# Patient Record
Sex: FEMALE | Race: WHITE | URBAN - METROPOLITAN AREA
[De-identification: names, ages, dates, MRNs, and addresses within clinical notes are randomized per-mention and may not be internally consistent; named-entity substitution may affect disease eponyms.]

---

## 2017-01-15 RX ORDER — ROSUVASTATIN CALCIUM 10 MG/1
TABLET, FILM COATED ORAL
Qty: 90 TABLET | Refills: 0 | Status: SHIPPED | OUTPATIENT
Start: 2017-01-15

## 2017-04-16 RX ORDER — ROSUVASTATIN CALCIUM 10 MG/1
TABLET, FILM COATED ORAL
Qty: 90 TABLET | Refills: 0 | OUTPATIENT
Start: 2017-04-16

## 2017-04-17 NOTE — TELEPHONE ENCOUNTER
Please call patient to set up an appointment with me, possibly today or my next day office schedule.

## 2017-04-21 NOTE — TELEPHONE ENCOUNTER
Spoke to pt notified of below. Offered appt. Pt states she will call back to schedule a follow-up.  Pt had blood work done at different MD office and will bring records along.     -kisha

## 2020-01-16 NOTE — LETTER
3/12/2024    Return to Work    Name: Starr Richey        : 1965    To Whom It May Concern,    Starr Richey had surgery on 2024 and is:    Not able to return to school / work.    The patient may return to work on 4/15/2024 with no restrictions.    If there are any further questions, regarding this patient's care, please contact the patient directly.    Sincerely,    Renee Elizabeth PA-C     Complex Repair And Melolabial Flap Text: The defect edges were debeveled with a #15 scalpel blade.  The primary defect was closed partially with a complex linear closure.  Given the location of the remaining defect, shape of the defect and the proximity to free margins a melolabial flap was deemed most appropriate for complete closure of the defect.  Using a sterile surgical marker, an appropriate advancement flap was drawn incorporating the defect and placing the expected incisions within the relaxed skin tension lines where possible.    The area thus outlined was incised deep to adipose tissue with a #15 scalpel blade.  The skin margins were undermined to an appropriate distance in all directions utilizing iris scissors.

## 2021-09-20 ENCOUNTER — MED REC SCAN ONLY (OUTPATIENT)
Dept: FAMILY MEDICINE CLINIC | Facility: CLINIC | Age: 56
End: 2021-09-20

## 2024-02-26 ENCOUNTER — ANESTHESIA EVENT (OUTPATIENT)
Dept: SURGERY | Facility: HOSPITAL | Age: 59
End: 2024-02-26
Payer: COMMERCIAL

## 2024-02-26 ENCOUNTER — HOSPITAL ENCOUNTER (INPATIENT)
Facility: HOSPITAL | Age: 59
LOS: 1 days | Discharge: HOME | End: 2024-02-27
Attending: EMERGENCY MEDICINE | Admitting: STUDENT IN AN ORGANIZED HEALTH CARE EDUCATION/TRAINING PROGRAM
Payer: COMMERCIAL

## 2024-02-26 ENCOUNTER — HOSPITAL ENCOUNTER (OUTPATIENT)
Age: 59
Discharge: ANOTHER HOSPITAL | End: 2024-02-26
Attending: EMERGENCY MEDICINE
Payer: COMMERCIAL

## 2024-02-26 ENCOUNTER — APPOINTMENT (OUTPATIENT)
Dept: CT IMAGING | Age: 59
End: 2024-02-26
Attending: EMERGENCY MEDICINE
Payer: COMMERCIAL

## 2024-02-26 ENCOUNTER — ANESTHESIA (OUTPATIENT)
Dept: SURGERY | Facility: HOSPITAL | Age: 59
End: 2024-02-26
Payer: COMMERCIAL

## 2024-02-26 VITALS
HEART RATE: 81 BPM | BODY MASS INDEX: 29 KG/M2 | OXYGEN SATURATION: 99 % | TEMPERATURE: 98 F | RESPIRATION RATE: 16 BRPM | WEIGHT: 152 LBS | SYSTOLIC BLOOD PRESSURE: 131 MMHG | DIASTOLIC BLOOD PRESSURE: 85 MMHG

## 2024-02-26 DIAGNOSIS — K35.80 ACUTE APPENDICITIS, UNSPECIFIED ACUTE APPENDICITIS TYPE: Primary | ICD-10-CM

## 2024-02-26 DIAGNOSIS — K35.80 ACUTE APPENDICITIS: ICD-10-CM

## 2024-02-26 DIAGNOSIS — K35.30 ACUTE APPENDICITIS WITH LOCALIZED PERITONITIS, WITHOUT PERFORATION, ABSCESS, OR GANGRENE: Primary | ICD-10-CM

## 2024-02-26 LAB
#MXD IC: 0.6 X10ˆ3/UL (ref 0.1–1)
ALBUMIN SERPL-MCNC: 3.8 G/DL (ref 3.4–5)
ALBUMIN/GLOB SERPL: 1.2 {RATIO} (ref 1–2)
ALP LIVER SERPL-CCNC: 126 U/L
ALT SERPL-CCNC: 43 U/L
ANION GAP SERPL CALC-SCNC: 1 MMOL/L (ref 0–18)
AST SERPL-CCNC: 38 U/L (ref 15–37)
BASOPHILS # BLD AUTO: 0.03 X10(3) UL (ref 0–0.2)
BASOPHILS NFR BLD AUTO: 0.3 %
BILIRUB SERPL-MCNC: 0.8 MG/DL (ref 0.1–2)
BILIRUB UR QL STRIP: NEGATIVE
BUN BLD-MCNC: 7 MG/DL (ref 9–23)
BUN BLD-MCNC: 9 MG/DL (ref 7–18)
CALCIUM BLD-MCNC: 8.9 MG/DL (ref 8.5–10.1)
CHLORIDE BLD-SCNC: 104 MMOL/L (ref 98–112)
CHLORIDE SERPL-SCNC: 110 MMOL/L (ref 98–112)
CLARITY UR: CLEAR
CO2 BLD-SCNC: 27 MMOL/L (ref 21–32)
CO2 SERPL-SCNC: 26 MMOL/L (ref 21–32)
CREAT BLD-MCNC: 0.5 MG/DL
CREAT BLD-MCNC: 0.56 MG/DL
EGFRCR SERPLBLD CKD-EPI 2021: 104 ML/MIN/1.73M2 (ref 60–?)
EGFRCR SERPLBLD CKD-EPI 2021: 107 ML/MIN/1.73M2 (ref 60–?)
EOSINOPHIL # BLD AUTO: 0.04 X10(3) UL (ref 0–0.7)
EOSINOPHIL NFR BLD AUTO: 0.4 %
ERYTHROCYTE [DISTWIDTH] IN BLOOD BY AUTOMATED COUNT: 11.6 %
GLOBULIN PLAS-MCNC: 3.3 G/DL (ref 2.8–4.4)
GLUCOSE BLD-MCNC: 102 MG/DL (ref 70–99)
GLUCOSE BLD-MCNC: 92 MG/DL (ref 70–99)
GLUCOSE UR STRIP-MCNC: NEGATIVE MG/DL
HCT VFR BLD AUTO: 39 %
HCT VFR BLD AUTO: 39.6 %
HCT VFR BLD CALC: 40 %
HGB BLD-MCNC: 13.4 G/DL
HGB BLD-MCNC: 13.5 G/DL
HGB UR QL STRIP: NEGATIVE
IMM GRANULOCYTES # BLD AUTO: 0.02 X10(3) UL (ref 0–1)
IMM GRANULOCYTES NFR BLD: 0.2 %
ISTAT IONIZED CALCIUM FOR CHEM 8: 1.2 MMOL/L (ref 1.12–1.32)
KETONES UR STRIP-MCNC: NEGATIVE MG/DL
LEUKOCYTE ESTERASE UR QL STRIP: NEGATIVE
LYMPHOCYTES # BLD AUTO: 1.5 X10ˆ3/UL (ref 1–4)
LYMPHOCYTES # BLD AUTO: 1.76 X10(3) UL (ref 1–4)
LYMPHOCYTES NFR BLD AUTO: 15.1 %
LYMPHOCYTES NFR BLD AUTO: 19.5 %
MCH RBC QN AUTO: 31.1 PG (ref 26–34)
MCH RBC QN AUTO: 31.8 PG (ref 26–34)
MCHC RBC AUTO-ENTMCNC: 33.8 G/DL (ref 31–37)
MCHC RBC AUTO-ENTMCNC: 34.6 G/DL (ref 31–37)
MCV RBC AUTO: 91.8 FL (ref 80–100)
MCV RBC AUTO: 91.9 FL
MIXED CELL %: 6.1 %
MONOCYTES # BLD AUTO: 0.57 X10(3) UL (ref 0.1–1)
MONOCYTES NFR BLD AUTO: 6.3 %
NEUTROPHILS # BLD AUTO: 6.59 X10 (3) UL (ref 1.5–7.7)
NEUTROPHILS # BLD AUTO: 6.59 X10(3) UL (ref 1.5–7.7)
NEUTROPHILS # BLD AUTO: 7.6 X10ˆ3/UL (ref 1.5–7.7)
NEUTROPHILS NFR BLD AUTO: 73.3 %
NEUTROPHILS NFR BLD AUTO: 78.8 %
NITRITE UR QL STRIP: NEGATIVE
OSMOLALITY SERPL CALC.SUM OF ELEC: 282 MOSM/KG (ref 275–295)
PH UR STRIP: 6.5 [PH]
PLATELET # BLD AUTO: 211 10(3)UL (ref 150–450)
PLATELET # BLD AUTO: 215 X10ˆ3/UL (ref 150–450)
POTASSIUM BLD-SCNC: 4 MMOL/L (ref 3.6–5.1)
POTASSIUM SERPL-SCNC: 3.4 MMOL/L (ref 3.5–5.1)
PROT SERPL-MCNC: 7.1 G/DL (ref 6.4–8.2)
PROT UR STRIP-MCNC: NEGATIVE MG/DL
RBC # BLD AUTO: 4.25 X10ˆ6/UL
RBC # BLD AUTO: 4.31 X10(6)UL
SODIUM BLD-SCNC: 140 MMOL/L (ref 136–145)
SODIUM SERPL-SCNC: 137 MMOL/L (ref 136–145)
SP GR UR STRIP: >=1.03
UROBILINOGEN UR STRIP-ACNC: <2 MG/DL
WBC # BLD AUTO: 9 X10(3) UL (ref 4–11)
WBC # BLD AUTO: 9.7 X10ˆ3/UL (ref 4–11)

## 2024-02-26 PROCEDURE — 96360 HYDRATION IV INFUSION INIT: CPT

## 2024-02-26 PROCEDURE — 85025 COMPLETE CBC W/AUTO DIFF WBC: CPT | Performed by: EMERGENCY MEDICINE

## 2024-02-26 PROCEDURE — 99222 1ST HOSP IP/OBS MODERATE 55: CPT | Performed by: STUDENT IN AN ORGANIZED HEALTH CARE EDUCATION/TRAINING PROGRAM

## 2024-02-26 PROCEDURE — 74177 CT ABD & PELVIS W/CONTRAST: CPT | Performed by: EMERGENCY MEDICINE

## 2024-02-26 PROCEDURE — 81002 URINALYSIS NONAUTO W/O SCOPE: CPT

## 2024-02-26 PROCEDURE — 44970 LAPAROSCOPY APPENDECTOMY: CPT | Performed by: STUDENT IN AN ORGANIZED HEALTH CARE EDUCATION/TRAINING PROGRAM

## 2024-02-26 PROCEDURE — 0DTJ4ZZ RESECTION OF APPENDIX, PERCUTANEOUS ENDOSCOPIC APPROACH: ICD-10-PCS | Performed by: STUDENT IN AN ORGANIZED HEALTH CARE EDUCATION/TRAINING PROGRAM

## 2024-02-26 PROCEDURE — 99205 OFFICE O/P NEW HI 60 MIN: CPT

## 2024-02-26 PROCEDURE — 80047 BASIC METABLC PNL IONIZED CA: CPT

## 2024-02-26 RX ORDER — NALOXONE HYDROCHLORIDE 0.4 MG/ML
0.08 INJECTION, SOLUTION INTRAMUSCULAR; INTRAVENOUS; SUBCUTANEOUS AS NEEDED
Status: DISCONTINUED | OUTPATIENT
Start: 2024-02-26 | End: 2024-02-26 | Stop reason: HOSPADM

## 2024-02-26 RX ORDER — KETOROLAC TROMETHAMINE 15 MG/ML
15 INJECTION, SOLUTION INTRAMUSCULAR; INTRAVENOUS EVERY 6 HOURS PRN
Status: DISCONTINUED | OUTPATIENT
Start: 2024-02-26 | End: 2024-02-27

## 2024-02-26 RX ORDER — DEXAMETHASONE SODIUM PHOSPHATE 4 MG/ML
VIAL (ML) INJECTION AS NEEDED
Status: DISCONTINUED | OUTPATIENT
Start: 2024-02-26 | End: 2024-02-26 | Stop reason: SURG

## 2024-02-26 RX ORDER — PROCHLORPERAZINE EDISYLATE 5 MG/ML
5 INJECTION INTRAMUSCULAR; INTRAVENOUS EVERY 8 HOURS PRN
Status: DISCONTINUED | OUTPATIENT
Start: 2024-02-26 | End: 2024-02-26 | Stop reason: HOSPADM

## 2024-02-26 RX ORDER — FAMOTIDINE 10 MG/ML
20 INJECTION, SOLUTION INTRAVENOUS 2 TIMES DAILY
Status: DISCONTINUED | OUTPATIENT
Start: 2024-02-26 | End: 2024-02-27

## 2024-02-26 RX ORDER — HEPARIN SODIUM 5000 [USP'U]/ML
5000 INJECTION, SOLUTION INTRAVENOUS; SUBCUTANEOUS EVERY 8 HOURS SCHEDULED
Status: DISCONTINUED | OUTPATIENT
Start: 2024-02-27 | End: 2024-02-26

## 2024-02-26 RX ORDER — LIDOCAINE HYDROCHLORIDE 10 MG/ML
INJECTION, SOLUTION EPIDURAL; INFILTRATION; INTRACAUDAL; PERINEURAL AS NEEDED
Status: DISCONTINUED | OUTPATIENT
Start: 2024-02-26 | End: 2024-02-26 | Stop reason: SURG

## 2024-02-26 RX ORDER — ACETAMINOPHEN 325 MG/1
650 TABLET ORAL EVERY 6 HOURS
Status: DISCONTINUED | OUTPATIENT
Start: 2024-02-26 | End: 2024-02-27

## 2024-02-26 RX ORDER — METOCLOPRAMIDE HYDROCHLORIDE 5 MG/ML
INJECTION INTRAMUSCULAR; INTRAVENOUS AS NEEDED
Status: DISCONTINUED | OUTPATIENT
Start: 2024-02-26 | End: 2024-02-26 | Stop reason: SURG

## 2024-02-26 RX ORDER — HYDROCODONE BITARTRATE AND ACETAMINOPHEN 5; 325 MG/1; MG/1
1 TABLET ORAL ONCE AS NEEDED
Status: DISCONTINUED | OUTPATIENT
Start: 2024-02-26 | End: 2024-02-26 | Stop reason: HOSPADM

## 2024-02-26 RX ORDER — BUPIVACAINE HYDROCHLORIDE 2.5 MG/ML
INJECTION, SOLUTION EPIDURAL; INFILTRATION; INTRACAUDAL AS NEEDED
Status: DISCONTINUED | OUTPATIENT
Start: 2024-02-26 | End: 2024-02-26 | Stop reason: HOSPADM

## 2024-02-26 RX ORDER — OMEPRAZOLE 20 MG/1
10 CAPSULE, DELAYED RELEASE ORAL DAILY PRN
COMMUNITY

## 2024-02-26 RX ORDER — CEFAZOLIN SODIUM 1 G/3ML
INJECTION, POWDER, FOR SOLUTION INTRAMUSCULAR; INTRAVENOUS AS NEEDED
Status: DISCONTINUED | OUTPATIENT
Start: 2024-02-26 | End: 2024-02-26 | Stop reason: SURG

## 2024-02-26 RX ORDER — ONDANSETRON 2 MG/ML
4 INJECTION INTRAMUSCULAR; INTRAVENOUS EVERY 6 HOURS PRN
Status: DISCONTINUED | OUTPATIENT
Start: 2024-02-26 | End: 2024-02-27

## 2024-02-26 RX ORDER — HYDROMORPHONE HYDROCHLORIDE 1 MG/ML
0.4 INJECTION, SOLUTION INTRAMUSCULAR; INTRAVENOUS; SUBCUTANEOUS EVERY 2 HOUR PRN
Status: DISCONTINUED | OUTPATIENT
Start: 2024-02-26 | End: 2024-02-27

## 2024-02-26 RX ORDER — MORPHINE SULFATE 2 MG/ML
1 INJECTION, SOLUTION INTRAMUSCULAR; INTRAVENOUS EVERY 2 HOUR PRN
Status: DISCONTINUED | OUTPATIENT
Start: 2024-02-26 | End: 2024-02-26

## 2024-02-26 RX ORDER — SODIUM CHLORIDE 9 MG/ML
INJECTION, SOLUTION INTRAVENOUS CONTINUOUS
Status: DISCONTINUED | OUTPATIENT
Start: 2024-02-26 | End: 2024-02-27

## 2024-02-26 RX ORDER — HYDROCODONE BITARTRATE AND ACETAMINOPHEN 5; 325 MG/1; MG/1
1 TABLET ORAL EVERY 6 HOURS PRN
Qty: 12 TABLET | Refills: 0 | Status: SHIPPED | OUTPATIENT
Start: 2024-02-26

## 2024-02-26 RX ORDER — ACETAMINOPHEN 500 MG
1000 TABLET ORAL ONCE AS NEEDED
Status: DISCONTINUED | OUTPATIENT
Start: 2024-02-26 | End: 2024-02-26 | Stop reason: HOSPADM

## 2024-02-26 RX ORDER — HYDROMORPHONE HYDROCHLORIDE 1 MG/ML
0.2 INJECTION, SOLUTION INTRAMUSCULAR; INTRAVENOUS; SUBCUTANEOUS EVERY 2 HOUR PRN
Status: DISCONTINUED | OUTPATIENT
Start: 2024-02-26 | End: 2024-02-27

## 2024-02-26 RX ORDER — SODIUM CHLORIDE 9 MG/ML
1000 INJECTION, SOLUTION INTRAVENOUS ONCE
Status: COMPLETED | OUTPATIENT
Start: 2024-02-26 | End: 2024-02-26

## 2024-02-26 RX ORDER — MORPHINE SULFATE 4 MG/ML
4 INJECTION, SOLUTION INTRAMUSCULAR; INTRAVENOUS EVERY 2 HOUR PRN
Status: DISCONTINUED | OUTPATIENT
Start: 2024-02-26 | End: 2024-02-26

## 2024-02-26 RX ORDER — SODIUM CHLORIDE, SODIUM LACTATE, POTASSIUM CHLORIDE, CALCIUM CHLORIDE 600; 310; 30; 20 MG/100ML; MG/100ML; MG/100ML; MG/100ML
INJECTION, SOLUTION INTRAVENOUS CONTINUOUS
Status: DISCONTINUED | OUTPATIENT
Start: 2024-02-26 | End: 2024-02-26 | Stop reason: HOSPADM

## 2024-02-26 RX ORDER — KETOROLAC TROMETHAMINE 30 MG/ML
INJECTION, SOLUTION INTRAMUSCULAR; INTRAVENOUS AS NEEDED
Status: DISCONTINUED | OUTPATIENT
Start: 2024-02-26 | End: 2024-02-26 | Stop reason: SURG

## 2024-02-26 RX ORDER — GLYCOPYRROLATE 0.2 MG/ML
INJECTION, SOLUTION INTRAMUSCULAR; INTRAVENOUS AS NEEDED
Status: DISCONTINUED | OUTPATIENT
Start: 2024-02-26 | End: 2024-02-26 | Stop reason: SURG

## 2024-02-26 RX ORDER — ROCURONIUM BROMIDE 10 MG/ML
INJECTION, SOLUTION INTRAVENOUS AS NEEDED
Status: DISCONTINUED | OUTPATIENT
Start: 2024-02-26 | End: 2024-02-26 | Stop reason: SURG

## 2024-02-26 RX ORDER — MORPHINE SULFATE 2 MG/ML
2 INJECTION, SOLUTION INTRAMUSCULAR; INTRAVENOUS EVERY 2 HOUR PRN
Status: DISCONTINUED | OUTPATIENT
Start: 2024-02-26 | End: 2024-02-26

## 2024-02-26 RX ORDER — MEPERIDINE HYDROCHLORIDE 25 MG/ML
25 INJECTION INTRAMUSCULAR; INTRAVENOUS; SUBCUTANEOUS
Status: DISCONTINUED | OUTPATIENT
Start: 2024-02-26 | End: 2024-02-26 | Stop reason: HOSPADM

## 2024-02-26 RX ORDER — NEOSTIGMINE METHYLSULFATE 1 MG/ML
INJECTION, SOLUTION INTRAVENOUS AS NEEDED
Status: DISCONTINUED | OUTPATIENT
Start: 2024-02-26 | End: 2024-02-26 | Stop reason: SURG

## 2024-02-26 RX ORDER — MIDAZOLAM HYDROCHLORIDE 1 MG/ML
1 INJECTION INTRAMUSCULAR; INTRAVENOUS EVERY 5 MIN PRN
Status: DISCONTINUED | OUTPATIENT
Start: 2024-02-26 | End: 2024-02-26 | Stop reason: HOSPADM

## 2024-02-26 RX ORDER — HYDROMORPHONE HYDROCHLORIDE 1 MG/ML
0.8 INJECTION, SOLUTION INTRAMUSCULAR; INTRAVENOUS; SUBCUTANEOUS EVERY 2 HOUR PRN
Status: DISCONTINUED | OUTPATIENT
Start: 2024-02-26 | End: 2024-02-27

## 2024-02-26 RX ORDER — HYDROCODONE BITARTRATE AND ACETAMINOPHEN 5; 325 MG/1; MG/1
1 TABLET ORAL EVERY 4 HOURS PRN
Status: DISCONTINUED | OUTPATIENT
Start: 2024-02-26 | End: 2024-02-26

## 2024-02-26 RX ORDER — ACETAMINOPHEN 325 MG/1
650 TABLET ORAL EVERY 4 HOURS PRN
Status: DISCONTINUED | OUTPATIENT
Start: 2024-02-26 | End: 2024-02-26

## 2024-02-26 RX ORDER — HYDROMORPHONE HYDROCHLORIDE 1 MG/ML
0.6 INJECTION, SOLUTION INTRAMUSCULAR; INTRAVENOUS; SUBCUTANEOUS EVERY 5 MIN PRN
Status: DISCONTINUED | OUTPATIENT
Start: 2024-02-26 | End: 2024-02-26 | Stop reason: HOSPADM

## 2024-02-26 RX ORDER — HYDROCODONE BITARTRATE AND ACETAMINOPHEN 5; 325 MG/1; MG/1
2 TABLET ORAL ONCE AS NEEDED
Status: DISCONTINUED | OUTPATIENT
Start: 2024-02-26 | End: 2024-02-26 | Stop reason: HOSPADM

## 2024-02-26 RX ORDER — HYDROMORPHONE HYDROCHLORIDE 1 MG/ML
INJECTION, SOLUTION INTRAMUSCULAR; INTRAVENOUS; SUBCUTANEOUS
Status: COMPLETED
Start: 2024-02-26 | End: 2024-02-26

## 2024-02-26 RX ORDER — ONDANSETRON 2 MG/ML
4 INJECTION INTRAMUSCULAR; INTRAVENOUS EVERY 6 HOURS PRN
Status: DISCONTINUED | OUTPATIENT
Start: 2024-02-26 | End: 2024-02-26

## 2024-02-26 RX ORDER — HYDROMORPHONE HYDROCHLORIDE 1 MG/ML
0.2 INJECTION, SOLUTION INTRAMUSCULAR; INTRAVENOUS; SUBCUTANEOUS EVERY 5 MIN PRN
Status: DISCONTINUED | OUTPATIENT
Start: 2024-02-26 | End: 2024-02-26 | Stop reason: HOSPADM

## 2024-02-26 RX ORDER — HYDROCODONE BITARTRATE AND ACETAMINOPHEN 5; 325 MG/1; MG/1
2 TABLET ORAL EVERY 4 HOURS PRN
Status: DISCONTINUED | OUTPATIENT
Start: 2024-02-26 | End: 2024-02-26

## 2024-02-26 RX ORDER — KETAMINE HYDROCHLORIDE 50 MG/ML
INJECTION, SOLUTION INTRAMUSCULAR; INTRAVENOUS AS NEEDED
Status: DISCONTINUED | OUTPATIENT
Start: 2024-02-26 | End: 2024-02-26 | Stop reason: SURG

## 2024-02-26 RX ORDER — HYDROCODONE BITARTRATE AND ACETAMINOPHEN 5; 325 MG/1; MG/1
1 TABLET ORAL EVERY 6 HOURS PRN
Status: DISCONTINUED | OUTPATIENT
Start: 2024-02-26 | End: 2024-02-27

## 2024-02-26 RX ORDER — METRONIDAZOLE 500 MG/100ML
500 INJECTION, SOLUTION INTRAVENOUS ONCE
Status: COMPLETED | OUTPATIENT
Start: 2024-02-26 | End: 2024-02-26

## 2024-02-26 RX ORDER — ONDANSETRON 2 MG/ML
4 INJECTION INTRAMUSCULAR; INTRAVENOUS EVERY 6 HOURS PRN
Status: DISCONTINUED | OUTPATIENT
Start: 2024-02-26 | End: 2024-02-26 | Stop reason: HOSPADM

## 2024-02-26 RX ORDER — MIDAZOLAM HYDROCHLORIDE 1 MG/ML
INJECTION INTRAMUSCULAR; INTRAVENOUS AS NEEDED
Status: DISCONTINUED | OUTPATIENT
Start: 2024-02-26 | End: 2024-02-26 | Stop reason: SURG

## 2024-02-26 RX ORDER — HYDROMORPHONE HYDROCHLORIDE 1 MG/ML
0.4 INJECTION, SOLUTION INTRAMUSCULAR; INTRAVENOUS; SUBCUTANEOUS EVERY 5 MIN PRN
Status: DISCONTINUED | OUTPATIENT
Start: 2024-02-26 | End: 2024-02-26 | Stop reason: HOSPADM

## 2024-02-26 RX ADMIN — DEXAMETHASONE SODIUM PHOSPHATE 4 MG: 4 MG/ML VIAL (ML) INJECTION at 19:47:00

## 2024-02-26 RX ADMIN — MIDAZOLAM HYDROCHLORIDE 2 MG: 1 INJECTION INTRAMUSCULAR; INTRAVENOUS at 18:35:00

## 2024-02-26 RX ADMIN — LIDOCAINE HYDROCHLORIDE 10 MG: 10 INJECTION, SOLUTION EPIDURAL; INFILTRATION; INTRACAUDAL; PERINEURAL at 18:35:00

## 2024-02-26 RX ADMIN — ROCURONIUM BROMIDE 40 MG: 10 INJECTION, SOLUTION INTRAVENOUS at 18:35:00

## 2024-02-26 RX ADMIN — GLYCOPYRROLATE 1 MG: 0.2 INJECTION, SOLUTION INTRAMUSCULAR; INTRAVENOUS at 19:47:00

## 2024-02-26 RX ADMIN — METOCLOPRAMIDE HYDROCHLORIDE 10 MG: 5 INJECTION INTRAMUSCULAR; INTRAVENOUS at 19:00:00

## 2024-02-26 RX ADMIN — KETAMINE HYDROCHLORIDE 50 MG: 50 INJECTION, SOLUTION INTRAMUSCULAR; INTRAVENOUS at 19:00:00

## 2024-02-26 RX ADMIN — ROCURONIUM BROMIDE 10 MG: 10 INJECTION, SOLUTION INTRAVENOUS at 19:00:00

## 2024-02-26 RX ADMIN — NEOSTIGMINE METHYLSULFATE 5 MG: 1 INJECTION, SOLUTION INTRAVENOUS at 19:47:00

## 2024-02-26 RX ADMIN — KETOROLAC TROMETHAMINE 30 MG: 30 INJECTION, SOLUTION INTRAMUSCULAR; INTRAVENOUS at 19:47:00

## 2024-02-26 RX ADMIN — CEFAZOLIN SODIUM 2 G: 1 INJECTION, POWDER, FOR SOLUTION INTRAMUSCULAR; INTRAVENOUS at 18:34:00

## 2024-02-26 NOTE — ED INITIAL ASSESSMENT (HPI)
Patient presenting after taylor sent form IC because CT showed acute appendicitis, patient went to IC because she had abdominal pain that started yesterday night.

## 2024-02-26 NOTE — PROGRESS NOTES
NURSING ADMISSION NOTE      Patient admitted via Cart  Oriented to room.  Safety precautions initiated.  Bed in low position.  Call light in reach.    A&O x4. Denies any CP, JACK, or calf pain at present. Lungs clear bilaterally. Abdomen soft, teder to RLQ, nondistended. Bowel sounds present, pt denies any nausea at this time. Voided upon arrival to floor. Pt reports pain is tolerable and denies need for any pain medication. POC discussed with pt/ at bedside, all questions answered. Pt resting in bed, call light within reach, safety precautions in place.     1812: Pt down to OR holding in stable condition.

## 2024-02-26 NOTE — H&P
[facility]  H&P    Starr Richey Patient Status:  Inpatient    1965 MRN [id number]   Location [facility] 3NW-A Attending Jasper Jeknins MD   Hosp Day # 0 PCP MY PARED[facility]     Requesting Physician:  Dr. Tran    Reason for Admission:  appendicitis    Chief Complaint:  Abdominal pain    History of Present Illness:  Starr Richey is a 58 year old female with distant h/o ovarian CA s/p hysterectomy, sent to ED from  with appendicitis. Pt reports abdominal pain started yesterday evening around 6:00 pm. Reports initially some rio-umbilical pain, which has since progressed to RLQ pain. Pain radiates to right lower back. She endorses nausea, though no vomiting. Decreased appetite. Subjective fever last night. Reports normal BM's. No urinary complaints.     Upon presentation to the immediate care, pt was afebrile and hemodynamically stable. CBC was entirely within normal limits, no leukocytosis. CMP revealed Alk phos 126, K 3.4, otherwise WNL.  CT a/p revealed findings consistent with acute appendicitis. Appendix is enlarged with periappendiceal stranding and retrocecal in location. Reactive RLQ lymph nodes. General surgery was consulted for further management.    Past medical history ovarian CA    Past abdominal surgical history hysterectomy    Denies any family history of colon or rectal CA.  Her most recent colonoscopy was in .      History:  Past Medical History:   Diagnosis Date    Diverticulosis     Lipid screening 2015    Other and unspecified hyperlipidemia     Ovarian cancer (HCC)     dx'd in  ;in remission     Past Surgical History:   Procedure Laterality Date    HYSTERECTOMY      LIPOMA REMOVAL       Family History   Problem Relation Age of Onset    Arrhythmia Mother     Ovarian Cancer Sister     Hypertension Mother       reports that she quit smoking about 22 years ago. Her smoking use included cigarettes. She has quit using smokeless tobacco. She reports that she does not  drink alcohol.    Allergies:  No Known Allergies    Medications:  Medications Prior to Admission   Medication Sig    Ascorbic Acid (VITAMIN C OR) Take by mouth.    CRESTOR 10 MG Oral Tab TAKE 1 TABLET BY MOUTH ONCE DAILY (Patient not taking: Reported on 2/26/2024)    melatonin 3 MG Oral Tab Take 2 tablet(s) at bed time. (Patient not taking: Reported on 2/26/2024)    guaiFENesin-codeine (CHERATUSSIN AC) 100-10 MG/5ML Oral Solution Take 5 mL by mouth every 6 (six) hours as needed. (Patient not taking: Reported on 2/26/2024)         Current Facility-Administered Medications:     acetaminophen (Tylenol) tab 650 mg, 650 mg, Oral, Q4H PRN **OR** HYDROcodone-acetaminophen (Norco) 5-325 MG per tab 1 tablet, 1 tablet, Oral, Q4H PRN **OR** HYDROcodone-acetaminophen (Norco) 5-325 MG per tab 2 tablet, 2 tablet, Oral, Q4H PRN    sodium chloride 0.9% infusion, , Intravenous, Continuous    morphINE PF 2 MG/ML injection 1 mg, 1 mg, Intravenous, Q2H PRN **OR** morphINE PF 2 MG/ML injection 2 mg, 2 mg, Intravenous, Q2H PRN **OR** morphINE PF 4 MG/ML injection 4 mg, 4 mg, Intravenous, Q2H PRN    [START ON 2/27/2024] heparin (Porcine) 5000 UNIT/ML injection 5,000 Units, 5,000 Units, Subcutaneous, Q8H DEMETRIUS    potassium chloride 40 mEq in 250mL sodium chloride 0.9% IVPB premix, 40 mEq, Intravenous, Once    Review of Systems:  Review of Systems   Constitutional:  Positive for activity change and fever. Negative for chills.   Respiratory:  Negative for chest tightness and shortness of breath.    Cardiovascular:  Negative for chest pain and leg swelling.   Gastrointestinal:  Positive for nausea and abdominal pain. Negative for heartburn, vomiting, diarrhea, constipation, blood in stool, abdominal distention, anal bleeding and rectal pain.   Genitourinary:  Negative for dysuria and difficulty urinating.   Skin:  Negative for color change and pallor.   Neurological:  Negative for dizziness, weakness and light-headedness.   All other systems  reviewed and are negative.      Physical Exam:  /72   Pulse 72   Temp 98.3 °F (36.8 °C) (Oral)   Resp 19   Wt 151 lb 14.4 oz (68.9 kg)   SpO2 98%   BMI 28.70 kg/m²   Physical Exam  Constitutional:       General: She is not in acute distress.     Appearance: Normal appearance. She is not toxic-appearing.   HENT:      Head: Normocephalic and atraumatic.      Nose: Nose normal.      Mouth/Throat:      Mouth: Mucous membranes are moist.   Eyes:      Conjunctiva/sclera: Conjunctivae normal.   Cardiovascular:      Rate and Rhythm: Normal rate and regular rhythm.   Pulmonary:      Effort: Pulmonary effort is normal. No respiratory distress.   Abdominal:      General: Abdomen is flat. Bowel sounds are normal. There is no distension.      Palpations: Abdomen is soft.      Tenderness: There is abdominal tenderness (RLQ ttp). There is guarding. There is no rebound.   Musculoskeletal:         General: No swelling or deformity. Normal range of motion.      Cervical back: Normal range of motion and neck supple.   Skin:     General: Skin is warm and dry.      Coloration: Skin is not jaundiced.      Findings: No erythema.   Neurological:      General: No focal deficit present.      Mental Status: She is alert.   Psychiatric:         Mood and Affect: Mood normal.         Behavior: Behavior normal.         Laboratory Data:  Recent Labs   Lab 02/26/24  1116 02/26/24  1522   RBC  --  4.31   HGB  --  13.4   HCT  --  39.6   MCV 91.8 91.9   MCH  --  31.1   MCHC 34.6 33.8   RDW  --  11.6   NEPRELIM  --  6.59   WBC  --  9.0   PLT  --  211.0       Recent Labs   Lab 02/26/24  1522   GLU 92   BUN 7*   CREATSERUM 0.56   CA 8.9   ALB 3.8      K 3.4*      CO2 26.0   ALKPHO 126*   AST 38*   ALT 43   BILT 0.8   TP 7.1         No results for input(s): \"PTP\", \"INR\", \"PTT\" in the last 168 hours.      CT ABDOMEN+PELVIS(CONTRAST ONLY)(CPT=74177)    Result Date: 2/26/2024  CONCLUSION:  1. There are findings consistent with acute  appendicitis.  Appendix is enlarged with periappendiceal stranding.  Appendix is in retrocecal location. 2. Mildly enlarged right lower quadrant lymph nodes are most likely reactive. 3. There is diverticulosis of colon without CT evidence of diverticulitis. 4. There is a small sliding-type hiatal hernia.   LOCATION:  [facility]   Dictated by (CST): Lambert Kyle MD on 2/26/2024 at 12:30 PM     Finalized by (CST): Lambert Kyle MD on 2/26/2024 at 12:34 PM          Nicole Farias PA-C  2/26/2024  5:28 PM    I have personally reviewed the imaging of patient's CT scan of the abdomen and pelvis.  I agree with radiology conclusion as above    Medical Decision Making         Impression:  Patient Active Problem List   Diagnosis    Acute appendicitis, unspecified acute appendicitis type       This is a very nice 58-year-old female who presents to the emergency room with 1 day of right lower quadrant pain.  Her CT scan and overall clinical picture is consistent with acute appendicitis.    Recommendations:  I recommend proceeding urgently to the operating room for laparoscopic appendectomy, possible open.  The details of this procedure were discussed including the expected recovery time, risks, benefits and alternatives.  Patient expressed understanding and was agreeable to proceed with surgery.  She has been added onto my operating room schedule pending OR availability.    In the meantime, please keep patient n.p.o. with IV fluids and IV antibiotics.  Continue pain and nausea medication as needed.  Postoperative disposition to be determined pending surgical findings and intraoperative course.    Jasper Jenkins MD  [facility] General Surgery

## 2024-02-26 NOTE — ED PROVIDER NOTES
Patient Seen in: [facility]      History     Chief Complaint   Patient presents with    Abdomen/Flank Pain     Stated Complaint: right abdomial pain    Subjective:   58-year-old female, remote history of ovarian cancer with total hysterectomy 20 years ago, presents with her daughter with complaints of right-sided abdominal pain started yesterday.  No diarrhea.  No constipation.  No blood in her stools.  No dysuria or hematuria.  She had some slight chills yesterday but no fever.            Objective:   Past Medical History:   Diagnosis Date    Diverticulosis     Lipid screening 2015    Other and unspecified hyperlipidemia     Ovarian cancer (HCC)     dx'd in  ;in remission              Past Surgical History:   Procedure Laterality Date    HYSTERECTOMY      LIPOMA REMOVAL                  Social History     Socioeconomic History    Marital status:    Tobacco Use    Smoking status: Former     Types: Cigarettes     Quit date: 2002     Years since quittin.1    Smokeless tobacco: Former   Substance and Sexual Activity    Alcohol use: No     Alcohol/week: 0.0 standard drinks of alcohol     Comment: non-drinker              Review of Systems   Constitutional:  Positive for chills. Negative for fever.   Respiratory:  Negative for shortness of breath.    Cardiovascular:  Negative for chest pain.   Gastrointestinal:  Positive for abdominal pain. Negative for blood in stool, constipation, diarrhea, nausea and vomiting.   Genitourinary:  Negative for dysuria and flank pain.   Musculoskeletal:  Negative for back pain.       Positive for stated complaint: right abdomial pain  Other systems are as noted in HPI.  Constitutional and vital signs reviewed.      All other systems reviewed and negative except as noted above.    Physical Exam     ED Triage Vitals [24 1021]   /85   Pulse 91   Resp 22   Temp 98.3 °F (36.8 °C)   Temp src Temporal   SpO2 99 %   O2 Device None (Room air)        Current:/85   Pulse 91   Temp 98.3 °F (36.8 °C) (Temporal)   Resp 22   Wt 68.9 kg   SpO2 99%   BMI 28.72 kg/m²         Physical Exam  Vitals and nursing note reviewed.   Constitutional:       Appearance: She is not toxic-appearing.   HENT:      Head: Normocephalic.   Cardiovascular:      Rate and Rhythm: Normal rate and regular rhythm.      Heart sounds: Normal heart sounds.   Pulmonary:      Effort: Pulmonary effort is normal. No respiratory distress.      Breath sounds: Normal breath sounds.   Abdominal:      General: Bowel sounds are normal.      Palpations: Abdomen is soft.      Tenderness: There is abdominal tenderness in the right lower quadrant.      Hernia: No hernia is present.   Skin:     General: Skin is warm and dry.   Neurological:      Mental Status: She is alert.               ED Course     Labs Reviewed   [facility] POCT URINALYSIS DIPSTICK - Abnormal; Notable for the following components:       Result Value    Urine Color Orange (*)     All other components within normal limits   POCT ISTAT CHEM8 CARTRIDGE - Abnormal; Notable for the following components:    ISTAT Glucose 102 (*)     ISTAT Creatinine 0.50 (*)     All other components within normal limits   POCT CBC                      MDM     CT ABDOMEN+PELVIS(CONTRAST ONLY)(CPT=74177)    Result Date: 2/26/2024  CONCLUSION:  1. There are findings consistent with acute appendicitis.  Appendix is enlarged with periappendiceal stranding.  Appendix is in retrocecal location. 2. Mildly enlarged right lower quadrant lymph nodes are most likely reactive. 3. There is diverticulosis of colon without CT evidence of diverticulitis. 4. There is a small sliding-type hiatal hernia.   LOCATION:  [facility]   Dictated by (CST): Lambert Kyle MD on 2/26/2024 at 12:30 PM     Finalized by (CST): Lambert Kyle MD on 2/26/2024 at 12:34 PM        58-year-old female with right lower quadrant pain.  No significant rebound or guarding.  Overall very well-appearing  and nontoxic.  1.4 cm dilated appendix on CT.  Consistent with acute appendicitis with periappendiceal stranding.  Discussed with the patient and daughter at bedside.  They prefer to go to [facility] where her daughter works.  Did discuss risk benefits alternatives.  Discussed perforation.  I will get them a copy of the report as well as the images on a disc to take with them and give them this ED care number to call with any questions.  Verbalized understanding of risk benefits alternatives therefore be discharged home at her request at this time but she understands she needs to go to an ER today for definitive management which include surgery. Daughter translating at the pts request.     Patient was screened and evaluated during this visit.  As the treating physician attending to the patient, I determined within reasonable clinical confidence and prior to discharge, that an emergency medical condition was not or was no longer present.  There was no indication for further evaluation, treatment, or admission on an emergency basis.  Comprehensive verbal and written discharge and follow-up instructions were provided to help prevent relapse or worsening.  Patient was instructed to follow-up with their primary care provider for further evaluation and treatment, return immediately to ER for worsening, concerning, new, or changing/persisting symptoms. I discussed the case with the patient and they had no questions, complaints, or concerns.  Patient was comfortable going home.     Per the discharge paperwork, patients are encouraged to and given instructions on how to [facility], where they have access to their records, including any/all incidental findings.     This note was prepared using Dragon Medical voice recognition dictation software. As a result errors may occur. When identified these errors have been corrected. While every attempt is made to correct errors during dictation discrepancies may still  exist    Note to patient: The 21st Century Cures Act makes medical notes like these available to patients in the interest of transparency. However, this is a medical document intended as peer to peer communication. It is written in medical language and may contain abbreviations or verbiage that are unfamiliar. It may appear blunt or direct. Medical documents are intended to carry relevant information, facts as evident, and the clinical opinion of the practitioner.                                      Medical Decision Making      Disposition and Plan     Clinical Impression:  1. Acute appendicitis with localized peritonitis, without perforation, abscess, or gangrene         Disposition:  Ic to ed  2/26/2024 12:41 pm    Follow-up:  ER                Medications Prescribed:  Current Discharge Medication List

## 2024-02-26 NOTE — ED QUICK NOTES
Orders for admission, patient is aware of plan and ready to go upstairs. Any questions, please call ED RN Laura at extension [phone].     Patient Covid vaccination status: Unvaccinated     COVID Test Ordered in ED: None    COVID Suspicion at Admission: N/A    Running Infusions:      Mental Status/LOC at time of transport: A/O x4,  at bedside     Other pertinent information:   CIWA score: N/A   NIH score:  N/A

## 2024-02-26 NOTE — ED QUICK NOTES
Patient resting in bed with side rails in place, call light with patient, unlabored respirations and family at bedside

## 2024-02-27 VITALS
RESPIRATION RATE: 16 BRPM | HEART RATE: 71 BPM | OXYGEN SATURATION: 98 % | SYSTOLIC BLOOD PRESSURE: 122 MMHG | BODY MASS INDEX: 29 KG/M2 | TEMPERATURE: 98 F | WEIGHT: 151 LBS | DIASTOLIC BLOOD PRESSURE: 67 MMHG

## 2024-02-27 LAB — POTASSIUM SERPL-SCNC: 4 MMOL/L (ref 3.5–5.1)

## 2024-02-27 NOTE — DISCHARGE INSTRUCTIONS
Post-Surgical Discharge Instructions    Jasper Jenkins MD    Pain: You may take acetaminophen (Tylenol) up to 650 mg every 6 hours as needed for mild-moderate pain. You may take ibuprofen (Motrin) up to 600 mg every 6 hours as needed for mild-moderate pain. Take narcotic pain medication as needed for severe pain per your prescription. Do not drive while taking narcotic pain medication. Many patients take a stool softener as narcotics are known to cause constipation. Okay to use ice packs over abdomen    Diet:  No restrictions.    Activity:  No heavy lifting greater than 10 lbs and no exercising for a total of 6 weeks from the date of surgery.  You may walk and climb stairs with moderation. If your abdomen is more uncomfortable than the day before, you need to be less active as you may be pulling on your stitches.    Bathing:  You may shower as often as you would like, but no submerging the incisions under water for a total of 2 weeks from the date of surgery.  This includes no swimming, no baths, and no hot-tubs.      Wound:  Keep the wound dry.  No dressing is necessary unless there is drainage coming from the wound.  If the drainage is excessive or looks like pus, please call our office.    Driving: You may drive provided that you are no longer taking your narcotic pain medication.      Bowel Function:  After surgery, your bowel movements will be irregular.  It is normal to have up to 3 bowel movements a day or as low as 1 bowel movement every 3 days.  Occasionally, your movements may be even more irregular than this.  As long as you are not vomiting or have a fever over 100.3, you don’t need to be overly concerned.      Return to Work:  Most patients return to work after 1-2 weeks from the date of their surgery.  You will still have a lifting restriction of no greater than 10 lbs from the date of your surgery until 6 weeks.  If your work requires heavy lifting, you will need to stay off work for 6 weeks.  When  you are ready to return to work, please call the office and we will send a work release to your employer.      Appointment: Please call our office for an appointment in 10-14 days after surgery, unless otherwise instructed.  This will allow ample time for the swelling and soreness to resolve before your wound is examined. If you have fevers, chills, or if you are concerned about your wound, please call us immediately at [phone].      Thank you for entrusting us with your care.

## 2024-02-27 NOTE — OPERATIVE REPORT
[facility]  Operative Note    Starr Richey Location: OR   [facility] [id number] MRN [id number]    1965 Age 58 year old   Admission Date 2024 Operation Date 2024   Attending Physician Jasper Jenkins MD Operating Physician Jasper Jenkins MD   PCP MY PAREDES          Patient Name: Starr Richey    Preoperative Diagnosis: Acute appendicitis [K35.80]    Postoperative Diagnosis: Acute appendicitis    Primary Surgeon: Jasper Jenkins MD    Assistant: None    Anesthesia: General    Procedures: Laparoscopic appendectomy    Implants: None    Specimen: Appendix    Drains: None    Estimated Blood Loss: 10 cc    Complications: None immediate    Condition: Stable    Indications for Surgery:   This is a very nice 58-year-old female who presents to the emergency room with 1 day of right lower quadrant pain.  Her CT scan and overall clinical picture is consistent with acute appendicitis.     I recommend proceeding urgently to the operating room for laparoscopic appendectomy, possible open.  The details of this procedure were discussed including the expected recovery time, risks, benefits and alternatives.  Patient expressed understanding and was agreeable to proceed with surgery.  Consent was signed.  All questions answered.    Surgical Findings:   Severely inflamed, dilated retrocecal appendix.    Description of Procedure:   Patient was brought to the operating room and laid supine on the OR table.  Bilateral sequential compression devices were placed.  Preoperative antibiotics were given. Patient was induced under general anesthesia.  Patient immediately voided prior to procedure.  Therefore, no Sharif catheter was placed.  The left arm was carefully tucked with all pressure points well-padded.  The abdomen was prepped and draped in the usual sterile fashion.  A timeout was performed.     I began by establishing pneumoperitoneum using a Veress needle through a stab incision at Hussein's point. There was  appropriately low opening pressure.  A 5 mm optical trocar was placed under direct laparoscopic vision just above the umbilicus.  The Veress needle was seen free-floating within the peritoneal cavity and was removed.  There was no evidence underlying visceral injury.  A 12 mm left lower quadrant and a 5 mm suprapubic trocar were placed under direct vision.  The patient was positioned right side up and in slight Trendelenburg.     Examination of the omental surface, liver surface and pelvis was unremarkable.  I was able to identify a severely inflamed and dilated retrocecal appendix beneath the right colon.  The appendix was pulled out of the retroperitoneum using blunt dissection.  The appendix was grasped and a window was made at the base of the mesoappendix using a Maryland dissector.  A 45 mm laparoscopic SKYLER stapler with blue load was used to divide the appendix at its base.  The base of the cecum was slightly inflamed and indurated. However, the staple line appeared to be well-approximated without any leakage when pressure was applied to the cecum.  The mesoappendix was divided using an Enseal device. The surgical field copiously irrigated, suctioned, and remained hemostatic.  The appendix was placed in a specimen bag and retrieved through the 12 mm left lateral trocar.  The patient was leveled. The appendix was retrieved within the specimen bag and passed off the field as a specimen.     The fascia at the 12 mm trocar site was closed using a single, interrupted 0 PDS suture with the assistance of a Delroy-Noam device.  The remaining trocars were removed under direct vision and appeared hemostatic.  Pneumoperitoneum was evacuated.  Each skin incision was anesthetized using a total of 30 cc of 0.25% Marcaine.  The skin incisions were closed using interrupted 4-0 Monocryl in a subcuticular fashion.  The skin was cleaned and dried and skin glue was placed over each incision as a final dressing.     Patient was  awakened from anesthesia, extubated and transported to the postanesthesia care unit in stable condition.  All sponge, needle and instrument counts were correct at the end of the case.  I was present for the entire case.      Jasper Jenkins MD  2/26/2024  8:08 PM

## 2024-02-27 NOTE — ANESTHESIA POSTPROCEDURE EVALUATION
[facility]    Starr Richey Patient Status:  Inpatient   Age/Gender 58 year old female MRN [id number]   Location [facility] SURGERY Attending Jasper Jenkins MD   Hosp Day # 0 PCP MY PAREDES       Anesthesia Post-op Note    LAPAROSCOPIC APPENDECTOMY    Procedure Summary       Date: 02/26/24 Room / Location:  MAIN OR 12 / [facility] MAIN OR    Anesthesia Start: 1833 Anesthesia Stop: 2006    Procedure: LAPAROSCOPIC APPENDECTOMY (Abdomen) Diagnosis:       Acute appendicitis      (Acute appendicitis [K35.80])    Surgeons: Jasper Jenkins MD Anesthesiologist: Jovany Oshea MD    Anesthesia Type: general ASA Status: 2            Anesthesia Type: general    Vitals Value Taken Time   BP 97/58 02/26/24 2003   Temp 98 02/26/24 2006   Pulse 76 02/26/24 2005   Resp 24 02/26/24 2005   SpO2 89 % 02/26/24 2005   Vitals shown include unfiled device data.    Patient Location: PACU    Anesthesia Type: general    Airway Patency: extubated    Postop Pain Control: adequate    Mental Status: preanesthetic baseline    Nausea/Vomiting: none    Cardiopulmonary/Hydration status: stable euvolemic    Complications: no apparent anesthesia related complications    Postop vital signs: stable    Dental Exam: Unchanged from Preop    Patient to be discharged home when criteria met.

## 2024-02-27 NOTE — ANESTHESIA PROCEDURE NOTES
Airway  Date/Time: 2/26/2024 6:37 PM  Urgency: elective    Airway not difficult    General Information and Staff    Patient location during procedure: OR  Anesthesiologist: Jovany Oshea MD  Performed: anesthesiologist   Performed by: Jovany Oshea MD  Authorized by: Jovany Oshea MD      Indications and Patient Condition  Indications for airway management: anesthesia  Spontaneous Ventilation: absent  Sedation level: deep  Preoxygenated: yes  Patient position: sniffing  Mask difficulty assessment: 1 - vent by mask    Final Airway Details  Final airway type: endotracheal airway      Successful airway: ETT  Cuffed: yes   Successful intubation technique: direct laryngoscopy  Endotracheal tube insertion site: oral  Blade: Billy  Blade size: #3  ETT size (mm): 7.5    Cormack-Lehane Classification: grade IIA - partial view of glottis  Placement verified by: capnometry   Cuff volume (mL): 6  Measured from: teeth  ETT to teeth (cm): 23  Number of attempts at approach: 1  Ventilation between attempts: none  Number of other approaches attempted: 0

## 2024-02-27 NOTE — PROGRESS NOTES
[facility]  Progress Note    Starr Richey Patient Status:  Inpatient    1965 MRN [id number]   Location [facility] 3NW-A Attending Jasper Jenkins MD   Hosp Day # 1 PCP MY PAREDES     Subjective:  The patient was seen and examined at bedside.  No acute events overnight.  The patient states her abdomen is sore, but the pain she was having prior to her operation has resolved.  She is tolerating a diet without nausea or vomiting.    Objective/Physical Exam:  /67 (BP Location: Left arm)   Pulse 71   Temp 97.8 °F (36.6 °C) (Oral)   Resp 16   Wt 151 lb (68.5 kg)   SpO2 98%   BMI 28.53 kg/m²     Intake/Output Summary (Last 24 hours) at 2024 0855  Last data filed at 2024 0800  Gross per 24 hour   Intake 2254 ml   Output 1410 ml   Net 844 ml         General: Alert, oriented x3. No acute distress.  HEENT: Normocephalic, atraumatic. No scleral icterus.  Pulmonary: No respiratory distress, effort normal.   Abdomen: Non-distended, without tympany to percussion. Soft, appropriate incisional site tenderness to palpation. No rebound or guarding. No peritoneal signs.   Incision: Laparoscopic incision sites are clean, dry, intact without surrounding erythema or cellulitis.  Skin glue is in place  Extremities: No lower extremity edema. No clubbing or cyanosis.   Skin: Warm, dry. No jaundice.       Labs:  Lab Results   Component Value Date    WBC 9.0 2024    HGB 13.4 2024    HCT 39.6 2024    .0 2024      No results found for: \"PT\", \"INR\"  Lab Results   Component Value Date     2024    K 4.0 2024     2024    CO2 26.0 2024    BUN 7 2024    CREATSERUM 0.56 2024    GLU 92 2024    CA 8.9 2024    ALKPHO 126 2024    ALT 43 2024    AST 38 2024    BILT 0.8 2024    ALB 3.8 2024    TP 7.1 2024          Assessment:  Patient Active Problem List   Diagnosis    Acute appendicitis,  unspecified acute appendicitis type     POD 1 laparoscopic appendectomy    Plan:  Overall, the patient is doing well postoperatively.  Plan for discharge home today  General diet  Encouraged ibuprofen and Tylenol primarily for pain management.  Will prescribe the patient Norco for breakthrough pain  No lifting or driving  The patient may shower  Follow-up with [facility] General surgery in 10 to 14 days  GI prophylaxis with Pepcid       My total face time with this patient was 25 minutes. Greater than half of the visit was spent in counseling the patient on the above listed diagnoses and treatment options.     Janina Alberto PA-C  2/27/2024  8:55 AM

## 2024-02-27 NOTE — PROGRESS NOTES
NURSING DISCHARGE NOTE    Discharged Home via Wheelchair.  Accompanied by Support staff  Belongings Taken by patient/family. IV dcd. All questions answered.

## 2024-02-27 NOTE — PROGRESS NOTES
Alert and oriented x 4. VSS. Denies pain, denies SOB. Denies nausea. Patient supposed to go home after Sx. Pain and nausea was not controlled. Both controlled now. Discharge order in. Ate breakfast this am and feeling well. Up ad neil. SL. Updated on plan of care. Call light in reach.

## 2024-02-27 NOTE — PLAN OF CARE
Received pt from PACU, drowsy, nauseous, A&Ox4. With 2L O2 NC. VSS .Denies chest pain and SOB.  Abdomen soft, nondistended. Hypoactive BS. Voids. Pain controlled with Tylenol and Toradol  Up with standby assist. Incisions: Lap site x4 dermabond EZIO, CDI  Diet: Regular diet. IVF running per order. Discouraged to go back home as pt still in pain and nauseated, Pt and  agreed. All appropriate safety measures in place. All questions and concerns addressed.

## 2024-02-27 NOTE — ED PROVIDER NOTES
Patient Seen in: [facility] 3nw-a      History     Chief Complaint   Patient presents with    Abdomen/Flank Pain     Stated Complaint: abdominal pain, + appy per CT    Subjective:   HPI  58-year-old woman no past medical history here for evaluation of lower abdominal discomfort, right-sided.  Was seen at the immediate care just prior to arrival, was diagnosed with acute appendicitis without complication on CT.  States her discomfort started last night reports poor appetite states has been n.p.o. since last night.  No recent fevers vomiting diarrhea any other complaints    Objective:   Past Medical History:   Diagnosis Date    Diverticulosis     Lipid screening 2015    Other and unspecified hyperlipidemia     Ovarian cancer (HCC)     dx'd in  ;in remission              Past Surgical History:   Procedure Laterality Date    HYSTERECTOMY      LIPOMA REMOVAL                  Social History     Socioeconomic History    Marital status:    Tobacco Use    Smoking status: Former     Types: Cigarettes     Quit date: 2002     Years since quittin.1    Smokeless tobacco: Former   Vaping Use    Vaping Use: Never used   Substance and Sexual Activity    Alcohol use: No     Alcohol/week: 0.0 standard drinks of alcohol     Comment: non-drinker    Drug use: Never     Social Determinants of Health     Food Insecurity: No Food Insecurity (2024)    Food Insecurity     Food Insecurity: Never true   Transportation Needs: No Transportation Needs (2024)    Transportation Needs     Lack of Transportation: No   Housing Stability: Low Risk  (2024)    Housing Stability     Housing Instability: No              Review of Systems    Positive for stated complaint: abdominal pain, + appy per CT  Other systems are as noted in HPI.  Constitutional and vital signs reviewed.      All other systems reviewed and negative except as noted above.    Physical Exam     ED Triage Vitals [24 1459]   /72    Pulse 80   Resp 18   Temp 98.3 °F (36.8 °C)   Temp src Oral   SpO2 97 %   O2 Device None (Room air)       Current:/67 (BP Location: Left arm)   Pulse 60   Temp 97.9 °F (36.6 °C) (Oral)   Resp 15   Wt 68.5 kg   SpO2 98%   BMI 28.53 kg/m²         Physical Exam        Physical Exam  Vitals signs and nursing note reviewed.   General: Well-appearing woman lying supine in bed in no acute distress  Head: Normocephalic and atraumatic.   HEENT:  Mucous membranes are moist.   Cardiovascular:  Normal rate and regular rhythm.  No Edema  Pulmonary:  Pulmonary effort is normal.  Normal breath sounds. No wheezing, rhonchi or rales.   Abdominal: Soft, there is right lower quadrant tenderness with guarding, no rebound tenderness, abdomen is nondistended, normal bowel sounds, no guarding no rebound tenderness  Skin: Warm and dry  Neurological: Awake alert, speech is normal        ED Course     Labs Reviewed   COMP METABOLIC PANEL (14) - Abnormal; Notable for the following components:       Result Value    Potassium 3.4 (*)     BUN 7 (*)     AST 38 (*)     Alkaline Phosphatase 126 (*)     All other components within normal limits   CBC WITH DIFFERENTIAL WITH PLATELET    Narrative:     The following orders were created for panel order CBC With Differential With Platelet.  Procedure                               Abnormality         Status                     ---------                               -----------         ------                     CBC W/ DIFFERENTIAL[[id number]]                              Final result                 Please view results for these tests on the individual orders.   POTASSIUM   SURGICAL PATHOLOGY TISSUE   CBC W/ DIFFERENTIAL             CT ABDOMEN+PELVIS(CONTRAST ONLY)(CPT=74177)    Result Date: 2/26/2024  PROCEDURE:  CT ABDOMEN+PELVIS (CONTRAST ONLY) (CPT=74177)  COMPARISON:  None.  INDICATIONS:  suprapubic/R sided abd pain  TECHNIQUE:  CT scanning was performed from the dome of the diaphragm  to the pubic symphysis with non-ionic intravenous contrast material. Post contrast coronal MPR imaging was performed.  Dose reduction techniques were used. Dose information is transmitted to the ACR (American College of Radiology) NRDR (National Radiology Data Registry) which includes the Dose Index Registry.  PATIENT STATED HISTORY:(As transcribed by Technologist)  PT state she has a history of ovarian cancer. She complains of right lower quadrant pain and discomfort.   CONTRAST USED:  80cc of Isovue 370  FINDINGS:  LIVER:  No enlargement, atrophy, abnormal density, or significant focal lesion.  BILIARY:  No visible dilatation or calcification.  PANCREAS:  There is a duodenal diverticulum in region of pancreatic head.  Pancreas is otherwise unremarkable. SPLEEN:  No enlargement or focal lesion.  KIDNEYS:  No mass, obstruction, or calcification.  ADRENALS:  No mass or enlargement.  AORTA/VASCULAR:  No aneurysm or dissection.  RETROPERITONEUM:  No mass or adenopathy.  BOWEL/MESENTERY:  There are findings of acute appendicitis with enlarged retrocecal appendix measuring up to 1.4 cm and stranding around the appendix.  There are enlarged right lower quadrant lymph nodes.  One lymph node seen right lower quadrant series 4, image 55 measures 2.1 x 0.9 cm. These are most likely reactive lymph nodes.  There is diverticulosis of the colon without CT evidence of diverticulitis.  There is a small hiatal hernia. ABDOMINAL WALL:  No mass or hernia.  URINARY BLADDER:  No visible focal wall thickening, lesion, or calculus.  PELVIC NODES:  No adenopathy.  PELVIC ORGANS:  Uterus is not visualized and was presumably removed. BONES:  No bony lesion or fracture.  LUNG BASES:  No visible pulmonary or pleural disease.  OTHER:  Negative.             CONCLUSION:  1. There are findings consistent with acute appendicitis.  Appendix is enlarged with periappendiceal stranding.  Appendix is in retrocecal location. 2. Mildly enlarged right  lower quadrant lymph nodes are most likely reactive. 3. There is diverticulosis of colon without CT evidence of diverticulitis. 4. There is a small sliding-type hiatal hernia.   LOCATION:  [facility]   Dictated by (CST): Lambert Kyle MD on 2/26/2024 at 12:30 PM     Finalized by (CST): Lambert Kyle MD on 2/26/2024 at 12:34 PM               [facility]        Admission disposition: 2/26/2024  3:23 PM                                        Medical Decision Making  58-year-old woman here for evaluation of abdominal discomfort.  Differential includes acute appendicitis, appendicitis with abscess or perforation.  CT abdomen pelvis at immediate care shows acute appendicitis Case discussed with general surgery, patient will be admitted to the general surgery service for further evaluation she was covered with ceftriaxone Flagyl as been n.p.o. last night has been any pain medications at this time.  General surgery service, Dr. Jenkins agrees with plan for admission    Problems Addressed:  Acute appendicitis, unspecified acute appendicitis type: acute illness or injury    Amount and/or Complexity of Data Reviewed  Independent Historian: spouse  Labs: ordered. Decision-making details documented in ED Course.  Radiology: ordered and independent interpretation performed. Decision-making details documented in ED Course.     Details: I independently viewed and interpreted the following imaging: CT abdomen pelvis without evidence of bowel obstruction    Risk  Prescription drug management.  Decision regarding hospitalization.        Disposition and Plan     Clinical Impression:  1. Acute appendicitis, unspecified acute appendicitis type    2. Acute appendicitis         Disposition:  Admit  2/26/2024  3:23 pm    Follow-up:  Jasper Jenkins MD  [address] [facility] [address]  [phone]    Schedule an appointment as soon as possible for a visit in 2 week(s)  Okay to see a PA          Medications Prescribed:  Current Discharge  Medication List        START taking these medications    Details   HYDROcodone-acetaminophen 5-325 MG Oral Tab Take 1 tablet by mouth every 6 (six) hours as needed.  Qty: 12 tablet, Refills: 0    Associated Diagnoses: Acute appendicitis, unspecified acute appendicitis type                               Hospital Problems       Present on Admission  Date Reviewed: 2/11/2016            ICD-10-CM Noted POA    * (Principal) Acute appendicitis, unspecified acute appendicitis type K35.80 2/26/2024 Unknown

## 2024-02-28 NOTE — PAYOR COMM NOTE
--------------  ADMISSION REVIEW   2/26-2/27  Payor: [employer] [facility] PPO  Subscriber #:  [id number]  Authorization Number: [id number]    Admit date: 2/26/24  Admit time:  5:24 PM       REVIEW DOCUMENTATION:    ED Provider Notes signed by Puneet Tran MD at 2/27/2024 12:58 AM    Patient Seen in: [facility] [id number]  History     Chief Complaint   Patient presents with    Abdomen/Flank Pain     Stated Complaint: abdominal pain, + appy per CT    58-year-old woman no past medical history here for evaluation of lower abdominal discomfort, right-sided.  Was seen at the immediate care just prior to arrival, was diagnosed with acute appendicitis without complication on CT.  States her discomfort started last night reports poor appetite states has been n.p.o. since last night.  No recent fevers vomiting diarrhea any other complaints    Objective:   Past Medical History:   Diagnosis Date    Diverticulosis     Lipid screening 11/17/2015    Other and unspecified hyperlipidemia     Ovarian cancer (HCC)     dx'd in 2003 ;in remission   Positive for stated complaint: abdominal pain, + appy per CT  Other systems are as noted in HPI.  Constitutional and vital signs reviewed.      All other systems reviewed and negative except as noted above.    Physical Exam     ED Triage Vitals [02/26/24 1459]   /72   Pulse 80   Resp 18   Temp 98.3 °F (36.8 °C)   Temp src Oral   SpO2 97 %   O2 Device None (Room air)       Current:/67 (BP Location: Left arm)   Pulse 60   Temp 97.9 °F (36.6 °C) (Oral)   Resp 15   Wt 68.5 kg   SpO2 98%   BMI 28.53 kg/m²     Physical Exam  Vitals signs and nursing note reviewed.   General: Well-appearing woman lying supine in bed in no acute distress  Head: Normocephalic and atraumatic.   HEENT:  Mucous membranes are moist.   Cardiovascular:  Normal rate and regular rhythm.  No Edema  Pulmonary:  Pulmonary effort is normal.  Normal breath sounds. No wheezing, rhonchi or rales.   Abdominal:  Soft, there is right lower quadrant tenderness with guarding, no rebound tenderness, abdomen is nondistended, normal bowel sounds, no guarding no rebound tenderness  Skin: Warm and dry  Neurological: Awake alert, speech is normal    Labs Reviewed   COMP METABOLIC PANEL (14) - Abnormal; Notable for the following components:       Result Value    Potassium 3.4 (*)     BUN 7 (*)     AST 38 (*)     Alkaline Phosphatase 126 (*)     All other components within normal limits   CBC WITH DIFFERENTIAL WITH PLATELET    Narrative:     The following orders were created for panel order CBC With Differential With Platelet.  Procedure                               Abnormality         Status                     ---------                               -----------         ------                     CBC W/ DIFFERENTIAL[[id number]]                              Final result                 Please view results for these tests on the individual orders.   POTASSIUM   SURGICAL PATHOLOGY TISSUE   CBC W/ DIFFERENTIAL     CT ABDOMEN+PELVIS(CONTRAST ONLY)(CPT=74177)    Result Date: 2/26/2024         CONCLUSION:  1. There are findings consistent with acute appendicitis.  Appendix is enlarged with periappendiceal stranding.  Appendix is in retrocecal location. 2. Mildly enlarged right lower quadrant lymph nodes are most likely reactive. 3. There is diverticulosis of colon without CT evidence of diverticulitis. 4. There is a small sliding-type hiatal hernia.   LOCATION:  [facility]   Dictated by (CST): Lambert Kyle MD on 2/26/2024 at 12:30 PM     Finalized by (CST): Lambert Kyle MD on 2/26/2024 at 12:34 PM          Admission disposition: 2/26/2024  3:23 PM       Medical Decision Making  58-year-old woman here for evaluation of abdominal discomfort.  Differential includes acute appendicitis, appendicitis with abscess or perforation.  CT abdomen pelvis at immediate care shows acute appendicitis Case discussed with general surgery, patient will be  admitted to the general surgery service for further evaluation she was covered with ceftriaxone Flagyl as been n.p.o. last night has been any pain medications at this time.  General surgery service, Dr. Jenkins agrees with plan for admission    Problems Addressed:  Acute appendicitis, unspecified acute appendicitis type: acute illness or injury    Amount and/or Complexity of Data Reviewed  Independent Historian: martir  Labs: ordered. Decision-making details documented in ED Course.  Radiology: ordered and independent interpretation performed. Decision-making details documented in ED Course.     Details: I independently viewed and interpreted the following imaging: CT abdomen pelvis without evidence of bowel obstruction    Risk  Prescription drug management.  Decision regarding hospitalization.    Disposition and Plan     Clinical Impression:  1. Acute appendicitis, unspecified acute appendicitis type    2. Acute appendicitis         Disposition:  Admit  2/26/2024  3:23 pm  Hospital Problems       Present on Admission  Date Reviewed: 2/11/2016            ICD-10-CM Noted POA    * (Principal) Acute appendicitis, unspecified acute appendicitis type K35.80 2/26/2024 Unknown               2/26 H&P  Reason for Admission:  appendicitis     Chief Complaint:  Abdominal pain     History of Present Illness:  Starr Richey is a 58 year old female with distant h/o ovarian CA s/p hysterectomy, sent to ED from  with appendicitis. Pt reports abdominal pain started yesterday evening around 6:00 pm. Reports initially some rio-umbilical pain, which has since progressed to RLQ pain. Pain radiates to right lower back. She endorses nausea, though no vomiting. Decreased appetite. Subjective fever last night. Reports normal BM's. No urinary complaints.      Upon presentation to the immediate care, pt was afebrile and hemodynamically stable. CBC was entirely within normal limits, no leukocytosis. CMP revealed Alk phos 126, K 3.4,  otherwise WNL.  CT a/p revealed findings consistent with acute appendicitis. Appendix is enlarged with periappendiceal stranding and retrocecal in location. Reactive RLQ lymph nodes. General surgery was consulted for further management.     Past medical history ovarian CA     Past abdominal surgical history hysterectomy     Denies any family history of colon or rectal CA.  Her most recent colonoscopy was in 2013.    Constitutional:  Positive for activity change and fever. Negative for chills.   Gastrointestinal:  Positive for nausea and abdominal pain.     Abdominal:      General: Abdomen is flat. Bowel sounds are normal. There is no distension.      Palpations: Abdomen is soft.      Tenderness: There is abdominal tenderness (RLQ ttp). There is guarding. There is no rebound.     This is a very nice 58-year-old female who presents to the emergency room with 1 day of right lower quadrant pain.  Her CT scan and overall clinical picture is consistent with acute appendicitis.     Recommendations:  I recommend proceeding urgently to the operating room for laparoscopic appendectomy, possible open.  The details of this procedure were discussed including the expected recovery time, risks, benefits and alternatives.  Patient expressed understanding and was agreeable to proceed with surgery.  She has been added onto my operating room schedule pending OR availability.     In the meantime, please keep patient n.p.o. with IV fluids and IV antibiotics.  Continue pain and nausea medication as needed.  Postoperative disposition to be determined pending surgical findings and intraoperative course.        2/26 Operative Note   Preoperative Diagnosis: Acute appendicitis [K35.80]     Postoperative Diagnosis: Acute appendicitis     Primary Surgeon: Jasper Jenkins MD     Assistant: None     Anesthesia: General     Procedures: Laparoscopic appendectomy     Implants: None     Specimen: Appendix     Drains: None     Estimated Blood Loss: 10  cc     Complications: None immediate     Condition: Stable     Indications for Surgery:   This is a very nice 58-year-old female who presents to the emergency room with 1 day of right lower quadrant pain.  Her CT scan and overall clinical picture is consistent with acute appendicitis.     I recommend proceeding urgently to the operating room for laparoscopic appendectomy, possible open.  The details of this procedure were discussed including the expected recovery time, risks, benefits and alternatives.  Patient expressed understanding and was agreeable to proceed with surgery.  Consent was signed.  All questions answered.     Surgical Findings:   Severely inflamed, dilated retrocecal appendix.     Description of Procedure:   Patient was brought to the operating room and laid supine on the OR table.  Bilateral sequential compression devices were placed.  Preoperative antibiotics were given. Patient was induced under general anesthesia.  Patient immediately voided prior to procedure.  Therefore, no Sharif catheter was placed.  The left arm was carefully tucked with all pressure points well-padded.  The abdomen was prepped and draped in the usual sterile fashion.  A timeout was performed.     I began by establishing pneumoperitoneum using a Veress needle through a stab incision at Hussein's point. There was appropriately low opening pressure.  A 5 mm optical trocar was placed under direct laparoscopic vision just above the umbilicus.  The Veress needle was seen free-floating within the peritoneal cavity and was removed.  There was no evidence underlying visceral injury.  A 12 mm left lower quadrant and a 5 mm suprapubic trocar were placed under direct vision.  The patient was positioned right side up and in slight Trendelenburg.     Examination of the omental surface, liver surface and pelvis was unremarkable.  I was able to identify a severely inflamed and dilated retrocecal appendix beneath the right colon.  The  appendix was pulled out of the retroperitoneum using blunt dissection.  The appendix was grasped and a window was made at the base of the mesoappendix using a [place] dissector.  A 45 mm laparoscopic SKYLER stapler with blue load was used to divide the appendix at its base.  The base of the cecum was slightly inflamed and indurated. However, the staple line appeared to be well-approximated without any leakage when pressure was applied to the cecum.  The mesoappendix was divided using an Enseal device. The surgical field copiously irrigated, suctioned, and remained hemostatic.  The appendix was placed in a specimen bag and retrieved through the 12 mm left lateral trocar.  The patient was leveled. The appendix was retrieved within the specimen bag and passed off the field as a specimen.     The fascia at the 12 mm trocar site was closed using a single, interrupted 0 PDS suture with the assistance of a Delroy-Noam device.  The remaining trocars were removed under direct vision and appeared hemostatic.  Pneumoperitoneum was evacuated.  Each skin incision was anesthetized using a total of 30 cc of 0.25% Marcaine.  The skin incisions were closed using interrupted 4-0 Monocryl in a subcuticular fashion.  The skin was cleaned and dried and skin glue was placed over each incision as a final dressing.     Patient was awakened from anesthesia, extubated and transported to the postanesthesia care unit in stable condition.  All sponge, needle and instrument counts were correct at the end of the case.  I was present for the entire case.          2/27 Surgery  POD 1 laparoscopic appendectomy     Plan:  Overall, the patient is doing well postoperatively.  Plan for discharge home today  General diet  Encouraged ibuprofen and Tylenol primarily for pain management.  Will prescribe the patient Norco for breakthrough pain  No lifting or driving  The patient may shower  Follow-up with [facility] General surgery in 10 to 14 days  GI  prophylaxis with Pepcid          Medications 02/25/24 02/26/24 02/27/24   acetaminophen (Tylenol) tab 650 mg  Dose: 650 mg  Freq: Every 6 hours Route: OR  Start: 02/26/24 2145 End: 02/27/24 1238      0023 MF-Given   (0345 MF)-Not Given [C]   0953 CW-Given     1238-D/C'd        cefTRIAXone (Rocephin) 1 g in D5W 100 mL IVPB-ADD  Dose: 1 g  Freq: Once Route: IV  Last Dose: Stopped (02/26/24 1622)  Start: 02/26/24 1524 End: 02/26/24 1622   Admin Instructions:   Ceftriaxone must NOT be administered simultaneously with calcium containing IV solutions. Includes Y-site as well.  In patients other than neonates ceftriaxone and calcium containing products may administered sequentially, provided the line is flushed in between administrations.   Order specific questions:        1534 AM-New Bag   1622 AM-Stopped          famotidine (Pepcid) 20 mg/2mL injection 20 mg  Dose: 20 mg  Freq: 2 times daily Route: IV  Start: 02/26/24 2145 End: 02/27/24 1238 2202 MF-Given        0953 CW-Given   1238-D/C'd       heparin (Porcine) 5000 UNIT/ML injection 5,000 Units  Dose: 5,000 Units  Freq: Every 8 hours scheduled Route: SC  Start: 02/27/24 0125 End: 02/26/24 2138 1817 UE-MAR Hold   2138 JS-MAR Unhold   2138-D/C'd       metRONIDAZOLE in sodium chloride 0.79% (Flagyl) 5 mg/mL IVPB premix 500 mg  Dose: 500 mg  Freq: Once Route: IV  Last Dose: 500 mg (02/26/24 1622)  Start: 02/26/24 1524 End: 02/26/24 1722   Order specific questions:        1622 AM-New Bag   1650 AM-Handoff          potassium chloride 40 mEq in 250mL sodium chloride 0.9% IVPB premix  Dose: 40 mEq  Freq: Once Route: IV  Last Dose: 40 mEq (02/26/24 1757)  Start: 02/26/24 1730 End: 02/26/24 2138 1757 TB-New Bag                   Medications 02/25/24 02/26/24 02/27/24   lactated ringers infusion  Rate: 100 mL/hr Freq: Continuous Route: IV  Last Dose: Stopped (02/27/24 0917)  Start: 02/26/24 2000 End: 02/26/24 2120 2012 KK-Rate/Dose Change   2120-D/C'd 0917  CW-Stopped          sodium chloride 0.9% infusion  Rate: 100 mL/hr Freq: Continuous Route: IV  Last Dose: Stopped (02/26/24 1906)  Start: 02/26/24 1730 End: 02/27/24 1238 1738 TB-New Bag   1833 MT-Continued by Anesthesia   1906 MT-Stopped      0917 CW-Stopped   1238-D/C'd              fentaNYL (Sublimaze) 50 mcg/mL injection 25 mcg  Dose: 25 mcg  Freq: Every 5 min PRN Route: IV  PRN Reason: moderate pain  Start: 02/26/24 1951 End: 02/26/24 2120   Admin Instructions:   Total maximum of 100 mcg.  Use PRN reason as a guide and follow range order policy.  Use as first line medication.     2023 KK-Given   2030 KK-Given   2046 KK-Given     2120-D/C'd         Or  HYDROmorphone (Dilaudid) 1 MG/ML injection 0.4 mg  Dose: 0.4 mg  Freq: Every 2 hour PRN Route: IV  PRN Reason: moderate pain  Start: 02/26/24 2139 End: 02/27/24 1238   Admin Instructions:   Use PRN reason as a guide and follow range order policy. If oral pain meds are ordered and patient can tolerate oral intake, start with PRN oral pain medications first.     2206 MF-Given        1238-D/C'd        Or  HYDROmorphone (Dilaudid) 1 MG/ML injection 0.6 mg  Dose: 0.6 mg  Freq: Every 5 min PRN Route: IV  PRN Reason: severe pain  Start: 02/26/24 1951 End: 02/26/24 2120   Admin Instructions:   Total maximum amount 3mg.  Use PRN reason as a guide and follow range order policy. Use as second line medication if first line narcotic is ineffective.     2037 KK-Given   2120-D/C'd        ketorolac (Toradol) 15 MG/ML injection 15 mg  Dose: 15 mg  Freq: Every 6 hours PRN Route: IV  PRN Reason: mild pain  Start: 02/26/24 2139 End: 02/27/24 1238     (2159 MF)-Not Given [C]        0610 MF-Given   1238-D/C'd       ondansetron (Zofran) 4 MG/2ML injection 4 mg  Dose: 4 mg  Freq: Every 6 hours PRN Route: IV  PRN Reason: Nausea  Start: 02/26/24 2139 End: 02/27/24 1238      0023 MF-Given   1238-D/C'd         Vitals (last day) before discharge       Date/Time Temp Pulse Resp BP SpO2  Weight O2 Device O2 Flow Rate (L/min) [facility]    02/27/24 0400 97.8 °F (36.6 °C) 71 16 122/67 98 % -- None (Room air) --     02/27/24 0000 97.6 °F (36.4 °C) 75 18 123/62 100 % -- Nasal cannula 2 L/min     02/26/24 2200 97.9 °F (36.6 °C) 60 15 147/67 98 % -- Nasal cannula 2 L/min     02/26/24 2121 -- -- -- 153/72 -- -- Nasal cannula 2 L/min     02/26/24 2050 98 °F (36.7 °C) 53 11 118/67 100 % -- Nasal cannula 2 L/min     02/26/24 2043 -- -- -- -- 100 % -- Nasal cannula 2 L/min     02/26/24 2037 -- -- -- -- 100 % -- -- --     02/26/24 2035 98.1 °F (36.7 °C) 57 9 121/76 100 % -- -- --     02/26/24 2030 -- -- -- -- 100 % -- -- --     02/26/24 2025 -- -- -- -- 100 % -- -- --     02/26/24 2023 -- -- -- -- 100 % -- -- --     02/26/24 2020 -- 68 18 101/69 100 % -- -- --     02/26/24 2015 -- 72 18 104/71 97 % -- Nasal cannula 2 L/min     02/26/24 2010 -- 73 23 98/64 98 % -- Simple mask 6 L/min     02/26/24 2005 97.8 °F (36.6 °C) 76 17 97/58 88 % -- Nasal cannula 5 L/min     02/26/24 2000 -- 75 18 -- 84 % -- None (Room air) --     02/26/24 1739 -- -- -- -- -- 151 lb -- -- TB    02/26/24 1731 98 °F (36.7 °C) 73 16 130/78 99 % -- None (Room air) -- TB    02/26/24 1600 -- 72 19 131/72 98 % -- None (Room air) -- AM    02/26/24 1530 -- 75 11 136/79 99 % -- None (Room air) -- AM    02/26/24 1512 -- -- -- -- -- -- None (Room air) -- AM    02/26/24 1459 98.3 °F (36.8 °C) 80 18 110/72 97 % 151 lb 14.4 oz None (Room air) -- RE                Discharge Notification    Patient Name: SANDRA LUCAS  Payor: [employer]  Subscriber #: [id number]  Authorization Number: [id number]  Admit Date/Time: 2/26/2024 3:04 PM  Discharge Date/Time: 2/27/2024 10:38 AM

## 2024-03-12 ENCOUNTER — OFFICE VISIT (OUTPATIENT)
Facility: LOCATION | Age: 59
End: 2024-03-12
Payer: COMMERCIAL

## 2024-03-12 VITALS — HEART RATE: 69 BPM | TEMPERATURE: 97 F

## 2024-03-12 DIAGNOSIS — Z90.49 STATUS POST LAPAROSCOPIC APPENDECTOMY: Primary | ICD-10-CM

## 2024-03-12 PROBLEM — K35.80 ACUTE APPENDICITIS, UNSPECIFIED ACUTE APPENDICITIS TYPE: Status: RESOLVED | Noted: 2024-02-26 | Resolved: 2024-03-12

## 2024-03-12 PROCEDURE — 99024 POSTOP FOLLOW-UP VISIT: CPT

## 2024-03-12 NOTE — PROGRESS NOTES
Post Operative Visit Note       Active Problems  1. Status post laparoscopic appendectomy         Chief Complaint   Chief Complaint   Patient presents with    Post-Op     PO - LAPAROSCOPIC APPENDECTOMY 24, Pt. States slight pain in abdomen. Pt. Denies n/v/d Pt. Denies fever/chills. Pt. Denies redness.           History of Present Illness   58 year old female POD# 15 from laparoscopic appendectomy presents for postop follow up.    The patient states she is doing well post operatively. She does report ongoing discomfort near her left lateral incision site. The patient reports the patient is increased with walking but is manageable. She states her pain is well controlled with Advil. She is tolerating oral intake and having fever or chills. She has no concern for infection. She denies fever or chills.       Allergies  Starr has No Known Allergies.    Past Medical / Surgical / Social / Family History    The past medical and past surgical history have been reviewed by me today.     Past Medical History:   Diagnosis Date    Diverticulosis     Lipid screening 2015    Other and unspecified hyperlipidemia     Ovarian cancer (HCC)     dx'd in  ;in remission     Past Surgical History:   Procedure Laterality Date    HYSTERECTOMY      LAPAROSCOPIC APPENDECTOMY  2024    LIPOMA REMOVAL         The family history and social history have been reviewed by me today.    Family History   Problem Relation Age of Onset    Arrhythmia Mother     Ovarian Cancer Sister     Hypertension Mother      Social History     Socioeconomic History    Marital status:    Tobacco Use    Smoking status: Former     Types: Cigarettes     Quit date: 2002     Years since quittin.2    Smokeless tobacco: Former   Vaping Use    Vaping Use: Never used   Substance and Sexual Activity    Alcohol use: No     Alcohol/week: 0.0 standard drinks of alcohol     Comment: non-drinker    Drug use: Never        Current Outpatient  Medications:     Ascorbic Acid (VITAMIN C OR), Take by mouth., Disp: , Rfl:     omeprazole 20 MG Oral Capsule Delayed Release, Take 10 mg by mouth daily as needed., Disp: , Rfl:     HYDROcodone-acetaminophen 5-325 MG Oral Tab, Take 1 tablet by mouth every 6 (six) hours as needed., Disp: 12 tablet, Rfl: 0    CRESTOR 10 MG Oral Tab, TAKE 1 TABLET BY MOUTH ONCE DAILY (Patient not taking: Reported on 2/26/2024), Disp: 90 tablet, Rfl: 0    melatonin 3 MG Oral Tab, Take 2 tablet(s) at bed time. (Patient not taking: Reported on 2/26/2024), Disp: , Rfl:     guaiFENesin-codeine (CHERATUSSIN AC) 100-10 MG/5ML Oral Solution, Take 5 mL by mouth every 6 (six) hours as needed. (Patient not taking: Reported on 2/26/2024), Disp: 120 mL, Rfl: 0      Review of Systems  A 10 point Review of Systems has been completed by me today and is negative except as above in the HPI.    Physical Findings   Pulse 69   Temp 97.2 °F (36.2 °C) (Temporal)   Gen/psych: alert and oriented, cooperative, no apparent distress  Cardiovascular: regular rate  Respiratory: respirations unlabored, no wheeze  Abdominal: soft, non-tender, non-distended, no guarding/rebound  Incisions: clean, dry, and intact. Healing appropriately. No bleeding or surrounding erythema         Assessment/Plan  1. Status post laparoscopic appendectomy        58 year old female POD# 15 from laparoscopic appendectomy    Overall the patient is doing well today. Discussed that it is typical to expect a slight degree of discomfort at incisions with increased movement.     Continue p.o. Tylenol or Motrin for pain as needed    The patient may apply soap and water directly to the incisions. Refrain from submerging the incisions until two weeks after surgery.    The skin glue will fall off on their own. Once they begin peeling off you can remove them from the incisions    Do not lift anything greater than 20 pounds until 6 weeks after surgery.    The patient may return to work on 4/15/2024.  If she would like to return sooner on a light duty basis she will reach out for an updated letter.    Pathology discussed with the patient- acute appendicitis     The patient is doing well and does not require further follow up appointments. If there are any questions or concerns, please call the office or make an appointment as needed.    The patient understands and agrees to the current plan. All questions and concerns were addressed during today's encounter.           No orders of the defined types were placed in this encounter.       Imaging & Referrals   None    Follow Up  Return if symptoms worsen or fail to improve.    Renee Elizabeth PA-C  [facility]  3/12/2024  9:54 AM

## 2024-06-25 ENCOUNTER — HOSPITAL ENCOUNTER (OUTPATIENT)
Age: 59
Discharge: HOME | End: 2024-06-25

## 2024-06-25 VITALS
DIASTOLIC BLOOD PRESSURE: 83 MMHG | RESPIRATION RATE: 18 BRPM | SYSTOLIC BLOOD PRESSURE: 147 MMHG | HEIGHT: 61 IN | WEIGHT: 154 LBS | TEMPERATURE: 98 F | BODY MASS INDEX: 29.07 KG/M2 | OXYGEN SATURATION: 97 % | HEART RATE: 82 BPM

## 2024-06-25 DIAGNOSIS — B34.9 VIRAL SYNDROME: Primary | ICD-10-CM

## 2024-06-25 LAB
S PYO AG THROAT QL IA.RAPID: NEGATIVE
SARS-COV-2 RNA RESP QL NAA+PROBE: NOT DETECTED

## 2024-06-25 PROCEDURE — 99213 OFFICE O/P EST LOW 20 MIN: CPT

## 2024-06-25 PROCEDURE — 87651 STREP A DNA AMP PROBE: CPT | Performed by: NURSE PRACTITIONER

## 2024-06-25 PROCEDURE — 99212 OFFICE O/P EST SF 10 MIN: CPT

## 2024-06-25 NOTE — ED PROVIDER NOTES
Patient Seen in: [facility]      History     Chief Complaint   Patient presents with    Cough/URI     Stated Complaint: sore throat    Subjective:   HPI  58-year-old female with hyperlipidemia, ovarian cancer in remission, and diverticulosis presents complaining of sore throat with chills and bodyaches with a slight cough that started yesterday.  She denies any chest pain or shortness of breath.  She denies any fever.    Objective:   Past Medical History:    Diverticulosis    Lipid screening    Other and unspecified hyperlipidemia    Ovarian cancer (HCC)    dx'd in  ;in remission              Past Surgical History:   Procedure Laterality Date    Appendectomy      Hysterectomy      Laparoscopic appendectomy  2024    Lipoma removal                  Social History     Socioeconomic History    Marital status:    Tobacco Use    Smoking status: Former     Current packs/day: 0.00     Types: Cigarettes     Quit date: 2002     Years since quittin.4    Smokeless tobacco: Former   Vaping Use    Vaping status: Never Used   Substance and Sexual Activity    Alcohol use: No     Alcohol/week: 0.0 standard drinks of alcohol     Comment: non-drinker    Drug use: Never     Social Determinants of Health     Food Insecurity: No Food Insecurity (2024)    Food Insecurity     Food Insecurity: Never true   Transportation Needs: No Transportation Needs (2024)    Transportation Needs     Lack of Transportation: No   Housing Stability: Low Risk  (2024)    Housing Stability     Housing Instability: No              Review of Systems   All other systems reviewed and are negative.      Positive for stated Chief Complaint: Cough/URI    Other systems are as noted in HPI.  Constitutional and vital signs reviewed.      All other systems reviewed and negative except as noted above.    Physical Exam     ED Triage Vitals [24 1527]   /83   Pulse 82   Resp 18   Temp 97.6 °F (36.4 °C)    Temp src Temporal   SpO2 97 %   O2 Device None (Room air)       Current Vitals:   Vital Signs  BP: 147/83  Pulse: 82  Resp: 18  Temp: 97.6 °F (36.4 °C)  Temp src: Temporal    Oxygen Therapy  SpO2: 97 %  O2 Device: None (Room air)            Physical Exam  Vitals and nursing note reviewed.   Constitutional:       General: She is not in acute distress.     Appearance: She is well-developed. She is not ill-appearing or toxic-appearing.   HENT:      Right Ear: Tympanic membrane, ear canal and external ear normal.      Left Ear: Tympanic membrane, ear canal and external ear normal.      Nose: No congestion or rhinorrhea.      Mouth/Throat:      Pharynx: No oropharyngeal exudate or posterior oropharyngeal erythema.   Cardiovascular:      Rate and Rhythm: Normal rate and regular rhythm.      Heart sounds: Normal heart sounds.   Pulmonary:      Effort: Pulmonary effort is normal. No respiratory distress.      Breath sounds: Normal breath sounds. No wheezing.   Skin:     General: Skin is warm and dry.   Neurological:      Mental Status: She is alert and oriented to person, place, and time.               ED Course     Labs Reviewed   RAPID SARS-COV-2 BY PCR - Normal   RAPID STREP A - Normal                      [facility]       Medical Decision Making  58-year-old female with hyperlipidemia, ovarian cancer in remission, and diverticulosis presents complaining of sore throat with chills and bodyaches with a slight cough that started yesterday.  She denies any chest pain or shortness of breath.  She denies any fever.    Clinical impression: Viral syndrome    Differential diagnosis: Viral syndrome, COVID, strep    COVID and strep are negative.  Patient is afebrile with no signs of hypoxia.  She will be discharged with symptomatic treatment with Tylenol, Motrin, throat lozenges, and Mucinex if cough worsens.    Problems Addressed:  Viral syndrome: acute illness or injury        Disposition and Plan     Clinical Impression:  1. Viral  syndrome         Disposition:  Discharge  6/25/2024  4:09 pm    Follow-up:  No follow-up provider specified.        Medications Prescribed:  Current Discharge Medication List

## 2024-06-25 NOTE — ED INITIAL ASSESSMENT (HPI)
Yesterday, c/o dry throat, chills and body aches. Denies fevers but a slight cough. Took Dayquil and Mucinex today.

## 2024-06-25 NOTE — DISCHARGE INSTRUCTIONS
Tylenol, Motrin, throat lozenges, and salt water gargles as needed for pain.  Mucinex if you start to get a cough.  Increase oral fluids.

## (undated) DEVICE — PACK PBDS LAP CHOLE MODULE

## (undated) DEVICE — Device

## (undated) DEVICE — PROBE ELECTROCAUTERY L HK TIP MPLR INSUL SHFT

## (undated) DEVICE — SUTURE VCRL SZ 0 L54IN ABSRB UD POLYGLACTIN

## (undated) DEVICE — TIP GRSP DISP HNTR RENEW

## (undated) DEVICE — DISSECTOR MIRCO TIP MARYLAND

## (undated) DEVICE — SYSTEM ABD ACCS 12X100MM 1ST ENTRY Z

## (undated) DEVICE — SYSTEM POS W20XH1XL29IN PT PIGAZZI

## (undated) DEVICE — COVER LT HNDL RIG FOR SUR CAM DISP

## (undated) DEVICE — SOLUTION IRRIG 1000ML 0.9% NACL USP BTL

## (undated) DEVICE — TRAY CATH 16FR F INCL BARDX IC COMPLT CARE

## (undated) DEVICE — GRASPER LAP L35MM DIA5MM EPIX

## (undated) DEVICE — GLOVE SURGICAL 7.5 SENSICARE P

## (undated) DEVICE — SYSTEM ACCS L100MM DIA5MM ABD 1ST ENTRY Z

## (undated) DEVICE — LAPAROVUE VISIBILITY SYS

## (undated) DEVICE — ENSEAL SEALER TISS L37 CM CURV

## (undated) DEVICE — GLOVE SUR 7 SENSICARE PI PIP CRM PWD F

## (undated) DEVICE — SUTURE MCRYL 4-0 18IN ABSRB UD 19MM PS-2 3/8

## (undated) DEVICE — POUCH SPECIMEN WIRE 6X3 250ML

## (undated) DEVICE — PENCIL TELESCOPE MEGADYNE SE

## (undated) DEVICE — ADHESIVE SKIN TOP FOR WND CLSR DERMBND ADV

## (undated) DEVICE — SLEEVE COMPR M KNEE LEN SGL USE KENDALL SCD

## (undated) DEVICE — SLEEVE TRCR L100MM DIA5MM ABD Z THRD KII

## (undated) DEVICE — APPLICATOR SKIN PREP 26ML HI LT ORNG 2% CHG

## (undated) DEVICE — BLADE SURG NO11 SS POLYMER COAT STR MICROCOAT

## (undated) DEVICE — RELOAD STPLR 45X3.6MM 6 ROW BLU ENDOSCP LIN

## (undated) DEVICE — STAPLER MED SHFT L340MM JAW L45MM STD ECHELON

## (undated) DEVICE — NEEDLE VERESS 120MM

## (undated) NOTE — LETTER
[address]  [address]  Authorization for Surgical Operation and Procedure     Date:___________                                                                                                         Time:__________  I hereby authorize Surgeon(s):  Jasper Jenkins MD, my physician and his/her assistants (if applicable), which may include medical students, residents, and/or fellows, to perform the following surgical operation/ procedure and administer such anesthesia as may be determined necessary by my physician:  Operation/Procedure name (s) Procedure(s):  LAPAROSCOPIC APPENDECTOMY on Starr Richey   2.   I recognize that during the surgical operation/procedure, unforeseen conditions may necessitate additional or different procedures than those listed above.  I, therefore, further authorize and request that the above-named surgeon, assistants, or designees perform such procedures as are, in their judgment, necessary and desirable.    3.   My surgeon/physician has discussed prior to my surgery the potential benefits, risks and side effects of this procedure; the likelihood of achieving goals; and potential problems that might occur during recuperation.  They also discussed reasonable alternatives to the procedure, including risks, benefits, and side effects related to the alternatives and risks related to not receiving this procedure.  I have had all my questions answered and I acknowledge that no guarantee has been made as to the result that may be obtained.    4.   Should the need arise during my operation/procedure, which includes change of level of care prior to discharge, I also consent to the administration of blood and/or blood products.  Further, I understand that despite careful testing and screening of blood or blood products by collecting agencies, I may still be subject to ill effects as a result of receiving a blood transfusion and/or blood products.  The  following are some, but not all, of the potential risks that can occur: fever and allergic reactions, hemolytic reactions, transmission of diseases such as Hepatitis, AIDS and Cytomegalovirus (CMV) and fluid overload.  In the event that I wish to have an autologous transfusion of my own blood, or a directed donor transfusion, I will discuss this with my physician.  Check only if Refusing Blood or Blood Products  I understand refusal of blood or blood products as deemed necessary by my physician may have serious consequences to my condition to include possible death. I hereby assume responsibility for my refusal and release the hospital, its personnel, and my physicians from any responsibility for the consequences of my refusal.          o  Refuse      5.   I authorize the use of any specimen, organs, tissues, body parts or foreign objects that may be removed from my body during the operation/procedure for diagnosis, research or teaching purposes and their subsequent disposal by hospital authorities.  I also authorize the release of specimen test results and/or written reports to my treating physician on the hospital medical staff or other referring or consulting physicians involved in my care, at the discretion of the Pathologist or my treating physician.    6.   I consent to the photographing or videotaping of the operations or procedures to be performed, including appropriate portions of my body for medical, scientific, or educational purposes, provided my identity is not revealed by the pictures or by descriptive texts accompanying them.  If the procedure has been photographed/videotaped, the surgeon will obtain the original picture, image, videotape or CD.  The hospital will not be responsible for storage, release or maintenance of the picture, image, tape or CD.    7.   I consent to the presence of a  or observers in the operating room as deemed necessary by my physician or their designees.     8.   I recognize that in the event my procedure results in extended X-Ray/fluoroscopy time, I may develop a skin reaction.    9. If I have a Do Not Attempt Resuscitation (DNAR) order in place, that status will be suspended while in the operating room, procedural suite, and during the recovery period unless otherwise explicitly stated by me (or a person authorized to consent on my behalf). The surgeon or my attending physician will determine when the applicable recovery period ends for purposes of reinstating the DNAR order.  10. Patients having a sterilization procedure: I understand that if the procedure is successful the results will be permanent and it will therefore be impossible for me to inseminate, conceive, or bear children.  I also understand that the procedure is intended to result in sterility, although the result has not been guaranteed.   11. I acknowledge that my physician has explained sedation/analgesia administration to me including the risk and benefits I consent to the administration of sedation/analgesia as may be necessary or desirable in the judgment of my physician.    I CERTIFY THAT I HAVE READ AND FULLY UNDERSTAND THE ABOVE CONSENT TO OPERATION and/or OTHER PROCEDURE.    _________________________________________  __________________________________  Signature of Patient     Signature of Responsible Person         ___________________________________         Printed Name of Responsible Person           _________________________________                 Relationship to Patient  _________________________________________  ______________________________  Signature of Witness          Date  Time      Patient Name: Starr Richey     : 1965                 Printed: 2024     Medical Record #: [id number]                     Page [address] of [address] [place], [place] [address], [place]  [address]    Consent for Anesthesia    I, Starr Richey  agree to be cared for by an anesthesiologist, who is specially trained to monitor me and give me medicine to put me to sleep or keep me comfortable during my procedure    I understand that my anesthesiologist is not an employee or agent of [facility] or [employer]. He or she works for [employer].    As the patient asking for anesthesia services, I agree to:  Allow the anesthesiologist (anesthesia doctor) to give me medicine and do additional procedures as necessary. Some examples are: Starting or using an “IV” to give me medicine, fluids or blood during my procedure, and having a breathing tube placed to help me breathe when I’m asleep (intubation). In the event that my heart stops working properly, I understand that my anesthesiologist will make every effort to sustain my life, unless otherwise directed by [facility] Do Not Resuscitate documents.  Tell my anesthesia doctor before my procedure:  If I am pregnant.  The last time that I ate or drank.  All of the medicines I take (including prescriptions, herbal supplements, and pills I can buy without a prescription (including street drugs/illegal medications). Failure to inform my anesthesiologist about these medicines may increase my risk of anesthetic complications.  If I am allergic to anything or have had a reaction to anesthesia before.  I understand how the anesthesia medicine will help me (benefits).  I understand that with any type of anesthesia medicine there are risks:  The most common risks are: nausea, vomiting, sore throat, muscle soreness, damage to my eyes, mouth, or teeth (from breathing tube placement).  Rare risks include: remembering what happened during my procedure, allergic reactions to medications, injury to my airway, heart, lungs, vision, nerves, or muscles and in extremely rare instances death.  My doctor has explained to me other choices available to me for my care (alternatives).  Pregnant Patients  (“epidural”):  I understand that the risks of having an epidural (medicine given into my back to help control pain during labor), include itching, low blood pressure, difficulty urinating, headache or slowing of the baby’s heart. Very rare risks include infection, bleeding, seizure, irregular heart rhythms and nerve injury.  Regional Anesthesia (“spinal”, “epidural”, & “nerve blocks”):  I understand that rare but potential complications include headache, bleeding, infection, seizure, irregular heart rhythms, and nerve injury.    I can change my mind about having anesthesia services at any time before I get the medicine.    _____________________________________________________________________________  Patient (or Representative) Signature/Relationship to Patient  Date   Time    _____________________________________________________________________________   Name (if used)    Language/Organization   Time    _____________________________________________________________________________  Anesthesiologist Signature     Date   Time  I have discussed the procedure and information above with the patient (or patient’s representative) and answered their questions. The patient or their representative has agreed to have anesthesia services.    _____________________________________________________________________________  Witness        Date   Time  I have verified that the signature is that of the patient or patient’s representative, and that it was signed before the procedure  Patient Name: Starr Richey     : 1965                 Printed: 2024     Medical Record #: [id number]                     Page 2 of 2

## (undated) NOTE — LETTER
Date & Time: 6/25/2024, 4:09 PM  Patient: Starr Richey  Encounter Provider(s):    Mai Harrison APRN       To Whom It May Concern:    Starr Richey was seen and treated in our department on 6/25/2024. She should not return to work until symptoms improve .    If you have any questions or concerns, please do not hesitate to call.        ________S.Christy_____________________  Physician/APC Signature